# Patient Record
Sex: FEMALE | Race: WHITE | Employment: FULL TIME | URBAN - NONMETROPOLITAN AREA
[De-identification: names, ages, dates, MRNs, and addresses within clinical notes are randomized per-mention and may not be internally consistent; named-entity substitution may affect disease eponyms.]

---
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
## 2020-12-03 ENCOUNTER — HOSPITAL ENCOUNTER (EMERGENCY)
Age: 20
Discharge: HOME | End: 2020-12-03
Attending: EMERGENCY MEDICINE
Payer: MEDICAID

## 2020-12-03 ENCOUNTER — APPOINTMENT (OUTPATIENT)
Dept: GENERAL RADIOLOGY | Age: 20
End: 2020-12-03
Payer: MEDICAID

## 2020-12-03 VITALS
DIASTOLIC BLOOD PRESSURE: 66 MMHG | TEMPERATURE: 98.4 F | BODY MASS INDEX: 20.25 KG/M2 | OXYGEN SATURATION: 100 % | SYSTOLIC BLOOD PRESSURE: 103 MMHG | RESPIRATION RATE: 14 BRPM | WEIGHT: 118 LBS | HEART RATE: 74 BPM

## 2020-12-03 VITALS
RESPIRATION RATE: 16 BRPM | HEART RATE: 78 BPM | OXYGEN SATURATION: 98 % | SYSTOLIC BLOOD PRESSURE: 132 MMHG | DIASTOLIC BLOOD PRESSURE: 78 MMHG | TEMPERATURE: 98.3 F

## 2020-12-03 LAB
ANION GAP SERPL CALCULATED.3IONS-SCNC: 13 MEQ/L (ref 8–16)
BASOPHILS # BLD: 0.5 %
BASOPHILS ABSOLUTE: 0 THOU/MM3 (ref 0–0.1)
BILIRUBIN URINE: ABNORMAL
BLOOD, URINE: ABNORMAL
BUN BLDV-MCNC: 10 MG/DL (ref 7–22)
CALCIUM SERPL-MCNC: 9 MG/DL (ref 8.5–10.5)
CHARACTER, URINE: CLEAR
CHLORIDE BLD-SCNC: 104 MEQ/L (ref 98–111)
CO2: 22 MEQ/L (ref 23–33)
COLOR: YELLOW
CREAT SERPL-MCNC: 0.6 MG/DL (ref 0.4–1.2)
D-DIMER QUANTITATIVE: < 215 NG/ML FEU (ref 0–500)
EKG ATRIAL RATE: 85 BPM
EKG P AXIS: 74 DEGREES
EKG P-R INTERVAL: 134 MS
EKG Q-T INTERVAL: 370 MS
EKG QRS DURATION: 92 MS
EKG QTC CALCULATION (BAZETT): 440 MS
EKG R AXIS: 84 DEGREES
EKG T AXIS: 6 DEGREES
EKG VENTRICULAR RATE: 85 BPM
EOSINOPHIL # BLD: 0.5 %
EOSINOPHILS ABSOLUTE: 0 THOU/MM3 (ref 0–0.4)
ERYTHROCYTE [DISTWIDTH] IN BLOOD BY AUTOMATED COUNT: 12.1 % (ref 11.5–14.5)
ERYTHROCYTE [DISTWIDTH] IN BLOOD BY AUTOMATED COUNT: 41.6 FL (ref 35–45)
GFR SERPL CREATININE-BSD FRML MDRD: > 90 ML/MIN/1.73M2
GLUCOSE BLD-MCNC: 124 MG/DL (ref 70–108)
GLUCOSE URINE: NEGATIVE MG/DL
HCT VFR BLD CALC: 41.7 % (ref 37–47)
HEMOGLOBIN: 13.9 GM/DL (ref 12–16)
IMMATURE GRANS (ABS): 0.01 THOU/MM3 (ref 0–0.07)
IMMATURE GRANULOCYTES: 0.2 %
KETONES, URINE: ABNORMAL
LEUKOCYTE ESTERASE, URINE: ABNORMAL
LYMPHOCYTES # BLD: 31.8 %
LYMPHOCYTES ABSOLUTE: 1.9 THOU/MM3 (ref 1–4.8)
MCH RBC QN AUTO: 31 PG (ref 26–33)
MCHC RBC AUTO-ENTMCNC: 33.3 GM/DL (ref 32.2–35.5)
MCV RBC AUTO: 93.1 FL (ref 81–99)
MONOCYTES # BLD: 8.3 %
MONOCYTES ABSOLUTE: 0.5 THOU/MM3 (ref 0.4–1.3)
NITRITE, URINE: NEGATIVE
NUCLEATED RED BLOOD CELLS: 0 /100 WBC
OSMOLALITY CALCULATION: 278 MOSMOL/KG (ref 275–300)
PH UA: 5.5 (ref 5–9)
PLATELET # BLD: 274 THOU/MM3 (ref 130–400)
PMV BLD AUTO: 9.2 FL (ref 9.4–12.4)
POTASSIUM REFLEX MAGNESIUM: 3.9 MEQ/L (ref 3.5–5.2)
PREGNANCY, SERUM: NEGATIVE
PROTEIN UA: 30 MG/DL
RBC # BLD: 4.48 MILL/MM3 (ref 4.2–5.4)
SEG NEUTROPHILS: 58.7 %
SEGMENTED NEUTROPHILS ABSOLUTE COUNT: 3.5 THOU/MM3 (ref 1.8–7.7)
SODIUM BLD-SCNC: 139 MEQ/L (ref 135–145)
SPECIFIC GRAVITY UA: >= 1.03 (ref 1–1.03)
TROPONIN T: < 0.01 NG/ML
UROBILINOGEN, URINE: 0.2 EU/DL (ref 0.2–1)
WBC # BLD: 6 THOU/MM3 (ref 4.8–10.8)

## 2020-12-03 PROCEDURE — 71045 X-RAY EXAM CHEST 1 VIEW: CPT

## 2020-12-03 PROCEDURE — 36415 COLL VENOUS BLD VENIPUNCTURE: CPT

## 2020-12-03 PROCEDURE — 99214 OFFICE O/P EST MOD 30 MIN: CPT | Performed by: EMERGENCY MEDICINE

## 2020-12-03 PROCEDURE — 6370000000 HC RX 637 (ALT 250 FOR IP): Performed by: EMERGENCY MEDICINE

## 2020-12-03 PROCEDURE — 81003 URINALYSIS AUTO W/O SCOPE: CPT

## 2020-12-03 PROCEDURE — U0003 INFECTIOUS AGENT DETECTION BY NUCLEIC ACID (DNA OR RNA); SEVERE ACUTE RESPIRATORY SYNDROME CORONAVIRUS 2 (SARS-COV-2) (CORONAVIRUS DISEASE [COVID-19]), AMPLIFIED PROBE TECHNIQUE, MAKING USE OF HIGH THROUGHPUT TECHNOLOGIES AS DESCRIBED BY CMS-2020-01-R: HCPCS

## 2020-12-03 PROCEDURE — 93005 ELECTROCARDIOGRAM TRACING: CPT | Performed by: EMERGENCY MEDICINE

## 2020-12-03 PROCEDURE — 85379 FIBRIN DEGRADATION QUANT: CPT

## 2020-12-03 PROCEDURE — 93010 ELECTROCARDIOGRAM REPORT: CPT | Performed by: INTERNAL MEDICINE

## 2020-12-03 PROCEDURE — 84484 ASSAY OF TROPONIN QUANT: CPT

## 2020-12-03 PROCEDURE — 99285 EMERGENCY DEPT VISIT HI MDM: CPT

## 2020-12-03 PROCEDURE — 80048 BASIC METABOLIC PNL TOTAL CA: CPT

## 2020-12-03 PROCEDURE — 84703 CHORIONIC GONADOTROPIN ASSAY: CPT

## 2020-12-03 PROCEDURE — 99213 OFFICE O/P EST LOW 20 MIN: CPT

## 2020-12-03 PROCEDURE — 85025 COMPLETE CBC W/AUTO DIFF WBC: CPT

## 2020-12-03 RX ORDER — IBUPROFEN 400 MG/1
400 TABLET ORAL EVERY 6 HOURS PRN
Qty: 20 TABLET | Refills: 0 | Status: SHIPPED | OUTPATIENT
Start: 2020-12-03 | End: 2021-02-02

## 2020-12-03 RX ORDER — ASPIRIN 81 MG/1
324 TABLET, CHEWABLE ORAL ONCE
Status: COMPLETED | OUTPATIENT
Start: 2020-12-03 | End: 2020-12-03

## 2020-12-03 RX ORDER — PHENAZOPYRIDINE HYDROCHLORIDE 100 MG/1
100 TABLET, FILM COATED ORAL 3 TIMES DAILY PRN
Qty: 9 TABLET | Refills: 0 | Status: SHIPPED | OUTPATIENT
Start: 2020-12-03 | End: 2021-01-25

## 2020-12-03 RX ORDER — ACETAMINOPHEN 500 MG
1000 TABLET ORAL ONCE
Status: COMPLETED | OUTPATIENT
Start: 2020-12-03 | End: 2020-12-03

## 2020-12-03 RX ORDER — CIPROFLOXACIN 500 MG/1
500 TABLET, FILM COATED ORAL 2 TIMES DAILY
Qty: 14 TABLET | Refills: 0 | Status: SHIPPED | OUTPATIENT
Start: 2020-12-03 | End: 2020-12-10

## 2020-12-03 RX ADMIN — ASPIRIN 324 MG: 81 TABLET, CHEWABLE ORAL at 10:41

## 2020-12-03 RX ADMIN — ACETAMINOPHEN 1000 MG: 500 TABLET ORAL at 12:03

## 2020-12-03 ASSESSMENT — PAIN SCALES - GENERAL
PAINLEVEL_OUTOF10: 2
PAINLEVEL_OUTOF10: 2
PAINLEVEL_OUTOF10: 3

## 2020-12-03 ASSESSMENT — ENCOUNTER SYMPTOMS
TROUBLE SWALLOWING: 0
COUGH: 1
EYE DISCHARGE: 0
BACK PAIN: 0
VOMITING: 0
SINUS PRESSURE: 0
CHOKING: 0
EYE PAIN: 0
VOICE CHANGE: 0
NAUSEA: 0
ABDOMINAL PAIN: 1
SHORTNESS OF BREATH: 1
SINUS PAIN: 0
SINUS PRESSURE: 0
COUGH: 0
SORE THROAT: 0
CHEST TIGHTNESS: 0
ABDOMINAL PAIN: 0
CONSTIPATION: 0
SORE THROAT: 1
BLOOD IN STOOL: 0
NAUSEA: 0
EYE REDNESS: 0
DIARRHEA: 0
STRIDOR: 0
FACIAL SWELLING: 0
SHORTNESS OF BREATH: 0
DIARRHEA: 0
CONSTIPATION: 0
EYE PAIN: 0
WHEEZING: 0
BACK PAIN: 0

## 2020-12-03 ASSESSMENT — PAIN DESCRIPTION - FREQUENCY: FREQUENCY: CONTINUOUS

## 2020-12-03 ASSESSMENT — PAIN DESCRIPTION - LOCATION: LOCATION: ABDOMEN

## 2020-12-03 ASSESSMENT — PAIN DESCRIPTION - PAIN TYPE: TYPE: ACUTE PAIN

## 2020-12-03 NOTE — ED NOTES
ED nurse-to-nurse bedside report    Chief Complaint   Patient presents with    Chest Pain    Shortness of Breath      LOC: alert and orientated to name, place, date  Vital signs   Vitals:    12/03/20 1026 12/03/20 1126 12/03/20 1227   BP: 116/62 109/62 110/70   Pulse: 78 72 69   Resp: 15 14 14   Temp: 98.4 °F (36.9 °C)     TempSrc: Oral     SpO2: 100% 100% 100%   Weight: 118 lb (53.5 kg)        Pain:    Pain Interventions: tylenol  Pain Goal: 0  Oxygen: No    Current needs required none   Telemetry: No  LDAs:    Continuous Infusions:   Mobility: Independent  Coles Fall Risk Score: No flowsheet data found.   Fall Interventions: side rails  Report given to: ZAHRAA Hall RN  12/03/20 7545

## 2020-12-03 NOTE — ED PROVIDER NOTES
[address]       Chief Complaint   Patient presents with    Dysuria       Nurses Notes reviewed and I agree except as noted in the HPI. HISTORY OF PRESENT ILLNESS   Randee Patel is a 21 y.o. female who presents with 3-day history of dysuria, urinary frequency, suprapubic pressure. She currently rates abdominal pain at 3 out of 10 in severity. Symptoms identical to previous cystitis, treated with Macrobid. No chest pain, shortness of breath, fever, vomiting, hematuria, dizziness, syncope, vaginal discharge or vaginal bleeding. No possibility of STD or pregnancy. No history of diabetes, urosepsis, pyelonephritis. Non-smoker    REVIEW OF SYSTEMS     Review of Systems   Constitutional: Negative for appetite change, chills, fatigue, fever and unexpected weight change. HENT: Negative for congestion, ear discharge, ear pain, facial swelling, hearing loss, nosebleeds, postnasal drip, sinus pressure, sore throat, trouble swallowing and voice change. Eyes: Negative for pain, discharge, redness and visual disturbance. Respiratory: Negative for cough, choking, shortness of breath, wheezing and stridor. Cardiovascular: Negative for chest pain and leg swelling. Gastrointestinal: Positive for abdominal pain. Negative for blood in stool, constipation, diarrhea, nausea and vomiting. Genitourinary: Positive for dysuria, frequency and urgency. Negative for flank pain, hematuria, vaginal bleeding and vaginal discharge. Musculoskeletal: Negative for arthralgias, back pain, neck pain and neck stiffness. Skin: Negative for rash. Neurological: Negative for dizziness, seizures, syncope, weakness, light-headedness and headaches. Hematological: Negative for adenopathy. Does not bruise/bleed easily. Psychiatric/Behavioral: Negative for confusion, sleep disturbance and suicidal ideas. The patient is not nervous/anxious.     All other systems reviewed and are negative. PAST MEDICAL HISTORY   History reviewed. No pertinent past medical history. Previous history of UTIs, no pyelonephritis, urosepsis, renal colic, diabetes  SURGICAL HISTORY     Patient  has a past surgical history that includes Tonsillectomy. CURRENT MEDICATIONS       Discharge Medication List as of 12/3/2020  9:03 AM      CONTINUE these medications which have NOT CHANGED    Details   [address] [facility] 28 0.25-35 MG-MCG per tablet TAKE 1 TABLET BY MOUTH EVERY DAY, DAWHistorical Med             ALLERGIES     Patient is has No Known Allergies. FAMILY HISTORY     Patient'sfamily history is not on file. Per patient-family history of hypertension no diabetes, renal disease. SOCIAL HISTORY     Patient  reports that she has never smoked. She has never used smokeless tobacco. She reports that she does not drink alcohol or use drugs. PHYSICAL EXAM     ED TRIAGE VITALS  BP: 132/78, Temp: 98.3 °F (36.8 °C), Pulse: 78, Resp: 16, SpO2: 98 %  Physical Exam  Vitals signs and nursing note reviewed. Constitutional:       Appearance: She is well-developed. Comments: Moist membranes, normal airway   HENT:      Head: Normocephalic and atraumatic. Right Ear: Tympanic membrane and external ear normal.      Left Ear: Tympanic membrane and external ear normal.      Nose: Nose normal.      Mouth/Throat:      Pharynx: No oropharyngeal exudate. Comments: Oropharynx normal  Eyes:      General: No scleral icterus. Right eye: No discharge. Left eye: No discharge. Conjunctiva/sclera: Conjunctivae normal.      Pupils: Pupils are equal, round, and reactive to light. Comments: Conjunctiva clear   Neck:      Musculoskeletal: Normal range of motion. Thyroid: No thyromegaly. Vascular: No JVD. Comments: No meningismus  Cardiovascular:      Rate and Rhythm: Normal rate and regular rhythm. Pulses: Normal pulses.       Heart sounds: Normal heart sounds, S1 normal and S2 normal. No murmur. No friction rub. No gallop. Pulmonary:      Effort: Pulmonary effort is normal. No tachypnea or respiratory distress. Breath sounds: Normal breath sounds. No stridor. No decreased breath sounds, wheezing, rhonchi or rales. Comments: No cough, lungs clear  Chest:      Chest wall: No tenderness. Abdominal:      General: Bowel sounds are normal. There is no distension. Palpations: Abdomen is soft. There is no mass. Tenderness: There is abdominal tenderness in the suprapubic area. There is no right CVA tenderness, left CVA tenderness, guarding or rebound. Comments: Slight suprapubic tenderness   Musculoskeletal: Normal range of motion. General: No tenderness. Right lower leg: Normal.      Left lower leg: Normal.      Comments: Joints normal   Lymphadenopathy:      Cervical: Cervical adenopathy present. Right cervical: Superficial cervical adenopathy present. No deep or posterior cervical adenopathy. Left cervical: Superficial cervical adenopathy present. No deep or posterior cervical adenopathy. Skin:     General: Skin is warm and dry. Findings: No erythema or rash. Comments: No rash or bruising on examined areas   Neurological:      Mental Status: She is alert and oriented to person, place, and time. Cranial Nerves: No cranial nerve deficit. Motor: No abnormal muscle tone. Coordination: Coordination normal.      Deep Tendon Reflexes: Reflexes are normal and symmetric. Reflexes normal.      Comments: Appropriate, no focal findings   Psychiatric:         Behavior: Behavior normal.         Thought Content:  Thought content normal.         Judgment: Judgment normal.         DIAGNOSTIC RESULTS   Labs:   Results for orders placed or performed during the hospital encounter of 12/03/20   Urinalysis   Result Value Ref Range    Glucose, Ur Negative NEGATIVE mg/dl    Bilirubin Urine Small (A) NEGATIVE    Ketones, Urine Trace (A) NEGATIVE    Specific Gravity, UA >=1.030 1.002 - 1.030    Blood, Urine Moderate (A) NEGATIVE    pH, UA 5.50 5.0 - 9.0    Protein, UA 30 (A) NEGATIVE mg/dl    Urobilinogen, Urine 0.20 0.2 - 1.0 eu/dl    Nitrite, Urine Negative NEGATIVE    Leukocyte Esterase, Urine Moderate (A) NEGATIVE    Color, UA Yellow STRAW-YELLOW    Character, Urine Clear CLEAR-SL CLOUD       IMAGING:  No orders to display     URGENT CARE COURSE:     Vitals:    12/03/20 0846   BP: 132/78   Pulse: 78   Resp: 16   Temp: 98.3 °F (36.8 °C)   TempSrc: Oral   SpO2: 98%       Medications - No data to display  PROCEDURES:  None  FINALIMPRESSION      1. Acute cystitis with hematuria        DISPOSITION/PLAN   DISPOSITION Decision To Discharge 12/03/2020 08:59:49 AM  Nontoxic, well-hydrated, normal airway. No sepsis or CNS infection. Abdomen nonsurgical.  Patient has acute cystitis without complications. No pyelonephritis or urosepsis. Will treat with Cipro, Pyridium, Tylenol, increased oral clear liquids, rest.  Patient to recheck with PCP in 5 days for reevaluation, and she was given referral number per her request.  She understands to go to ED if worse  PATIENT REFERRED TO:  Recheck with regular doctor    Schedule an appointment as soon as possible for a visit in 5 days  Recheck with regular doctor, use referral number.   Go to emergency if worse    DISCHARGE MEDICATIONS:  Discharge Medication List as of 12/3/2020  9:03 AM      START taking these medications    Details   ciprofloxacin (CIPRO) 500 MG tablet Take 1 tablet by mouth 2 times daily for 7 days, Disp-14 tablet,R-0Print      phenazopyridine (PYRIDIUM) 100 MG tablet Take 1 tablet by mouth 3 times daily as needed for Pain (dysuria), Disp-9 tablet,R-0Print           Discharge Medication List as of 12/3/2020  9:03 AM          MD Tiffany Veloz MD  12/03/20 [id number] Executive MD Domingo  12/03/20 7557

## 2020-12-03 NOTE — LETTER
[facility]  [address] [place] [id number]  Phone: [phone]               December 3, 2020    Patient: Anthony Camarena   YOB: 2000   Date of Visit: 12/3/2020       To Whom It May Concern:    Mike Barba was seen and treated in our emergency department on 12/3/2020. She may return to work on December 5, 2020.   No work December 3 and December 4, 2020    Sincerely,       Edna Benítez MD         Signature:__________________________________

## 2020-12-03 NOTE — ED PROVIDER NOTES
[facility] ENCOUNTER          Pt Name: Olivia Sánchez  MRN: [id number]  [place] 2000  Date of evaluation: 12/3/2020  Physician: Martínez Iraheta MD, Arabella Conde, [address] [place], [place] [address]       Chief Complaint   Patient presents with    Chest Pain    Shortness of Breath     History obtained from chart review and the patient. HISTORY OF PRESENT ILLNESS    HPI  Olivia Sánchez is a 21 y.o. female who presents to the emergency department for evaluation of 1 week mild soreness of breath, dry cough, occasional fever and chills, sore throat. Patient states she believes she may have COVID-19. Patient does not currently have a PCP. States he has not been taking any medication for his symptoms but decided to get checked today. Denies sick contacts at home or at work. The patient has no other acute complaints at this time. REVIEW OF SYSTEMS   Review of Systems   Constitutional: Positive for chills, fatigue and fever. Negative for unexpected weight change. HENT: Positive for sore throat. Negative for congestion, ear pain, nosebleeds, sinus pressure and sinus pain. Eyes: Negative for pain. Respiratory: Positive for cough and shortness of breath. Negative for chest tightness. Cardiovascular: Negative for chest pain. Gastrointestinal: Negative for abdominal pain, constipation, diarrhea and nausea. Endocrine: Negative for polyuria. Genitourinary: Negative for dysuria and flank pain. Musculoskeletal: Negative for arthralgias, back pain, myalgias and neck pain. Skin: Negative for rash. Neurological: Negative for weakness and headaches. All other systems reviewed and are negative. PAST MEDICAL AND SURGICAL HISTORY   History reviewed. No pertinent past medical history.   Past Surgical History:   Procedure Laterality Date    TONSILLECTOMY           MEDICATIONS   No current facility-administered medications for this encounter. Current Outpatient Medications:     ibuprofen (IBU) 400 MG tablet, Take 1 tablet by mouth every 6 hours as needed for Pain, Disp: 20 tablet, Rfl: 0    SPRINTEC 28 0.25-35 MG-MCG per tablet, TAKE 1 TABLET BY MOUTH EVERY DAY, Disp: , Rfl:     ciprofloxacin (CIPRO) 500 MG tablet, Take 1 tablet by mouth 2 times daily for 7 days, Disp: 14 tablet, Rfl: 0    phenazopyridine (PYRIDIUM) 100 MG tablet, Take 1 tablet by mouth 3 times daily as needed for Pain (dysuria), Disp: 9 tablet, Rfl: 0      SOCIAL HISTORY     Social History     Social History Narrative    Not on file     Social History     Tobacco Use    Smoking status: Never Smoker    Smokeless tobacco: Never Used   Substance Use Topics    Alcohol use: No    Drug use: No         ALLERGIES   No Known Allergies      FAMILY HISTORY   No family history on file. PREVIOUS RECORDS   Previous records reviewed:   Seen in the emergency department on December 3, 2020 for cystitis. PHYSICAL EXAM     ED Triage Vitals [12/03/20 1026]   BP Temp Temp Source Pulse Resp SpO2 Height Weight   116/62 98.4 °F (36.9 °C) Oral 78 15 100 % -- 118 lb (53.5 kg)     Initial vital signs and nursing assessment reviewed and normal. Pulsoximetry is normal per my interpretation. Additional Vital Signs:  Vitals:    12/03/20 1318   BP: 103/66   Pulse: 74   Resp:    Temp:    SpO2:        Physical Exam  Vitals signs and nursing note reviewed. Constitutional:       General: She is not in acute distress. Appearance: Normal appearance. She is well-developed. HENT:      Head: Normocephalic and atraumatic. Right Ear: External ear normal.      Left Ear: External ear normal.      Nose: Nose normal.      Mouth/Throat:      Pharynx: Posterior oropharyngeal erythema present. No oropharyngeal exudate. Eyes:      Conjunctiva/sclera: Conjunctivae normal.      Pupils: Pupils are equal, round, and reactive to light. Neck:      Musculoskeletal: Neck supple. Vascular: No JVD. Cardiovascular:      Rate and Rhythm: Normal rate and regular rhythm. Heart sounds: Normal heart sounds. No murmur. No friction rub. No gallop. Pulmonary:      Effort: Pulmonary effort is normal.      Breath sounds: Normal breath sounds. No stridor. No wheezing or rales. Skin:     General: Skin is warm and dry. Neurological:      Mental Status: She is alert and oriented to person, place, and time. Psychiatric:         Behavior: Behavior normal.             MEDICAL DECISION MAKING   Initial Assessment: Viral illness, there is a possibility of the symptoms being caused by COVID-19. Chest pain with abnormal but not acutely ischemic EKG. Plan: I V line, labs, analgesia, imaging, observation. ED RESULTS   Laboratory results:  Labs Reviewed   CBC WITH AUTO DIFFERENTIAL - Abnormal; Notable for the following components:       Result Value    MPV 9.2 (*)     All other components within normal limits   BASIC METABOLIC PANEL W/ REFLEX TO MG FOR LOW K - Abnormal; Notable for the following components:    CO2 22 (*)     Glucose 124 (*)     All other components within normal limits   TROPONIN   HCG, SERUM, QUALITATIVE   D-DIMER, QUANTITATIVE   ANION GAP   OSMOLALITY   GLOMERULAR FILTRATION RATE, ESTIMATED   COVID-19 AMBULATORY       Radiologic studies results:  XR CHEST PORTABLE   Final Result   1. No acute cardiopulmonary findings. **This report has been created using voice recognition software. It may contain minor errors which are inherent in voice recognition technology. **      Final report electronically signed by Dr. Kathy Bush on 12/3/2020 10:47 AM                ED COURSE   ED Medications administered this visit:   Medications   aspirin chewable tablet 324 mg (324 mg Oral Given 12/3/20 1041)   acetaminophen (TYLENOL) tablet 1,000 mg (1,000 mg Oral Given 12/3/20 1203)       ED Course as of Dec 03 1407   Thu Dec 03, 2020   1400 Patient pain-free at the moment, feeling much better, feeling comfortable going home. Work-up is all within normal limits, negative D-dimer and negative troponin. We will advised to follow-up with PCP for possible abnormal EKG versus persistent juvenile T wave inversion. Will discharge. [DT]      ED Course User Index  [DT] Franky Jarrell MD     Strict return precautions and follow up instructions were discussed with the patient prior to discharge, with which the patient agrees. MEDICATION CHANGES     New Prescriptions    IBUPROFEN (IBU) 400 MG TABLET    Take 1 tablet by mouth every 6 hours as needed for Pain         FINAL DISPOSITION     Final diagnoses:   Atypical chest pain   Suspected COVID-19 virus infection   EKG, abnormal     Condition: condition: good  Dispo: Discharge to home      This transcription was electronically signed. It was dictated by use of voice recognition software and electronically transcribed. The transcription may contain errors not detected in proofreading.        Franky Jarrell MD  12/03/20 7980

## 2020-12-03 NOTE — ED NOTES
Pt discharged. Pt verbalized understanding of discharge instructions and scripts. Pt walked out per self. Pt in stable condition.      Harriet Barber, DENIS  09/03/84 5669

## 2020-12-03 NOTE — ED NOTES
[facility] Pt is resting on cot, respirations even and unlabored.      Rachela Hart RN  12/03/20 6562

## 2020-12-04 ENCOUNTER — CARE COORDINATION (OUTPATIENT)
Dept: CARE COORDINATION | Age: 20
End: 2020-12-04

## 2020-12-04 LAB — SARS-COV-2: DETECTED

## 2020-12-04 NOTE — CARE COORDINATION
Patient contacted regarding COVID-19 exposure. Discussed COVID-19 related testing which was pending at this time. Test results were pending. Patient informed of results, if available? No    Care Transition Nurse/ Ambulatory Care Manager contacted the patient by telephone to perform post discharge assessment. Call within 2 business days of discharge: Yes. Verified name and  with patient as identifiers. Provided introduction to self, and explanation of the CTN/ACM role, and reason for call due to risk factors for infection and/or exposure to COVID-19. Symptoms reviewed with patient who verbalized the following symptoms: cough, shortness of breath, chest pain and nasal congestion, runny nose, sore throat. Due to no new or worsening symptoms encounter was not routed to provider for escalation. Discussed follow-up appointments. If no appointment was previously scheduled, appointment scheduling offered: Yes and scheduled to est PCP  Gulshan Robles Dr follow up appointment(s):   Future Appointments   Date Time Provider Tammy Reyes   2020  2:00 PM 3980 Max VARGAS DO SRPX  RES [address] [place] [address]     Non-[facility] follow up appointment(s): DAVE    Non-face-to-face services provided:  Obtained and reviewed discharge summary and/or continuity of care documents  loop enrollment     Advance Care Planning:   Does patient have an Advance Directive:  reviewed and current. Patient has following risk factors of: no known risk factors. CTN/ACM reviewed discharge instructions, medical action plan and red flags such as increased shortness of breath, increasing fever and signs of decompensation with patient who verbalized understanding. Discussed exposure protocols and quarantine with CDC Guidelines What to do if you are sick with coronavirus disease .  Patient was given an opportunity for questions and concerns.  The patient agrees to contact the [facility] exposure line [phone], [facility] department [place] Department of [facility]: ([phone]) and PCP office for questions related to their healthcare. CTN/ACM provided contact information for future needs. Reviewed and educated patient on any new and changed medications related to discharge diagnosis     Patient/family/caregiver given information for Get[facility] Loop and agrees to enroll yes  Patient's preferred e-mail:    Patient's preferred phone number: [phone]  Based on Loop alert triggers, patient will be contacted by nurse care manager for worsening symptoms. Pt will be further monitored by COVID Loop Team based on severity of symptoms and risk factors. Advised symptom management, fluids, rest, report new worsening symptoms to ACM or Loop. Taking cipro for UTI. Scheduled appt to est PCP.

## 2020-12-07 ENCOUNTER — HOSPITAL ENCOUNTER (EMERGENCY)
Age: 20
Discharge: HOME | End: 2020-12-07
Payer: MEDICAID

## 2020-12-07 VITALS
TEMPERATURE: 98.7 F | OXYGEN SATURATION: 99 % | SYSTOLIC BLOOD PRESSURE: 102 MMHG | HEART RATE: 90 BPM | RESPIRATION RATE: 16 BRPM | DIASTOLIC BLOOD PRESSURE: 58 MMHG

## 2020-12-07 LAB
BILIRUBIN URINE: NEGATIVE
BLOOD, URINE: NEGATIVE
CHARACTER, URINE: CLEAR
COLOR: YELLOW
GLUCOSE URINE: NEGATIVE MG/DL
KETONES, URINE: NEGATIVE
LEUKOCYTE ESTERASE, URINE: NEGATIVE
NITRITE, URINE: NEGATIVE
PH UA: 5.5 (ref 5–9)
PROTEIN UA: ABNORMAL MG/DL
SPECIFIC GRAVITY UA: >= 1.03 (ref 1–1.03)
UROBILINOGEN, URINE: 0.2 EU/DL (ref 0.2–1)

## 2020-12-07 PROCEDURE — 96372 THER/PROPH/DIAG INJ SC/IM: CPT

## 2020-12-07 PROCEDURE — 6360000002 HC RX W HCPCS: Performed by: NURSE PRACTITIONER

## 2020-12-07 PROCEDURE — 2500000003 HC RX 250 WO HCPCS: Performed by: NURSE PRACTITIONER

## 2020-12-07 PROCEDURE — 99213 OFFICE O/P EST LOW 20 MIN: CPT

## 2020-12-07 PROCEDURE — 99213 OFFICE O/P EST LOW 20 MIN: CPT | Performed by: NURSE PRACTITIONER

## 2020-12-07 PROCEDURE — 81003 URINALYSIS AUTO W/O SCOPE: CPT

## 2020-12-07 RX ADMIN — LIDOCAINE HYDROCHLORIDE 1 G: 10 INJECTION, SOLUTION EPIDURAL; INFILTRATION; INTRACAUDAL; PERINEURAL at 13:24

## 2020-12-07 ASSESSMENT — PAIN SCALES - GENERAL: PAINLEVEL_OUTOF10: 3

## 2020-12-07 ASSESSMENT — PAIN DESCRIPTION - PAIN TYPE: TYPE: ACUTE PAIN

## 2020-12-07 ASSESSMENT — ENCOUNTER SYMPTOMS
VOMITING: 0
SHORTNESS OF BREATH: 0
BACK PAIN: 1
COUGH: 0
NAUSEA: 0

## 2020-12-07 ASSESSMENT — PAIN DESCRIPTION - LOCATION: LOCATION: BACK

## 2020-12-07 ASSESSMENT — PAIN DESCRIPTION - ORIENTATION: ORIENTATION: LOWER

## 2020-12-07 NOTE — ED NOTES
Patient understood instructions verbally,  Follow up with PCP with any concerns, or worse UTI symptoms with elevated fevers, follow up with ED.  ambulated self to lobby,stable condition.       Juanita White LPN  93/32/02 3711

## 2020-12-07 NOTE — ED NOTES
Pt complains of having been diagnosed with covid and a uti on Friday. States she is still on her cipro, but she isn't feeling any better.       Chivo Lorenzo RN  12/07/20 3928

## 2020-12-07 NOTE — ED PROVIDER NOTES
[facility]       CHIEF COMPLAINT       Chief Complaint   Patient presents with    Urinary Tract Infection       Nurses Notes reviewed and I agree except as noted in the HPI. HISTORY OF PRESENT ILLNESS   Shefali Caraballo is a 21 y.o. female who presents for evaluation of urinary frequency and low back pain. Patient states that this is been ongoing ever since her UTI diagnosis 4 days ago. Patient was also diagnosed with coronavirus at that time. She denies any fever, chills, nausea, or vomiting. Patient states that she was told to return if her back pain did not improve as this is a sign of her urinary tract infection not improving. The history is provided by the patient. REVIEW OF SYSTEMS     Review of Systems   Constitutional: Negative for chills and fever. Respiratory: Negative for cough and shortness of breath. Cardiovascular: Negative for chest pain. Gastrointestinal: Negative for nausea and vomiting. Genitourinary: Positive for frequency. Negative for dysuria. Musculoskeletal: Positive for back pain. Negative for arthralgias and myalgias. Skin: Negative for rash. Allergic/Immunologic: Negative for environmental allergies. Neurological: Negative for headaches. PAST MEDICAL HISTORY   History reviewed. No pertinent past medical history. SURGICALHISTORY     Patient  has a past surgical history that includes Tonsillectomy. CURRENT MEDICATIONS       Previous Medications    CIPROFLOXACIN (CIPRO) 500 MG TABLET    Take 1 tablet by mouth 2 times daily for 7 days    IBUPROFEN (IBU) 400 MG TABLET    Take 1 tablet by mouth every 6 hours as needed for Pain    PHENAZOPYRIDINE (PYRIDIUM) 100 MG TABLET    Take 1 tablet by mouth 3 times daily as needed for Pain (dysuria)    SPRINTEC 28 0.25-35 MG-MCG PER TABLET    TAKE 1 TABLET BY MOUTH EVERY DAY       ALLERGIES     Patient is has No Known Allergies.     Patients   There is no immunization history on file for this patient. FAMILY HISTORY     Patient's family history is not on file. SOCIAL HISTORY     Patient  reports that she has never smoked. She has never used smokeless tobacco. She reports that she does not drink alcohol or use drugs. PHYSICAL EXAM     ED TRIAGE VITALS  BP: 119/67, Temp: 98.7 °F (37.1 °C), Pulse: 92, Resp: 16, SpO2: 99 %,Estimated body mass index is 20.25 kg/m² as calculated from the following:    Height as of 6/21/18: 5' 4\" (1.626 m). Weight as of 12/3/20: 118 lb (53.5 kg). ,Patient's last menstrual period was 11/18/2020. Physical Exam  Vitals signs and nursing note reviewed. Constitutional:       General: She is not in acute distress. Appearance: She is well-developed. She is not diaphoretic. Eyes:      Conjunctiva/sclera:      Right eye: Right conjunctiva is not injected. Left eye: Left conjunctiva is not injected. Pupils: Pupils are equal.   Neck:      Musculoskeletal: Normal range of motion. Cardiovascular:      Rate and Rhythm: Normal rate and regular rhythm. Heart sounds: No murmur. Pulmonary:      Effort: Pulmonary effort is normal. No respiratory distress. Breath sounds: Normal breath sounds. Abdominal:      Tenderness: There is no abdominal tenderness. There is no right CVA tenderness or left CVA tenderness. Musculoskeletal:      Right knee: She exhibits normal range of motion. Left knee: She exhibits normal range of motion. Lumbar back: She exhibits tenderness (bilateral). She exhibits no bony tenderness. Skin:     General: Skin is warm. Findings: No rash. Neurological:      Mental Status: She is alert and oriented to person, place, and time.    Psychiatric:         Behavior: Behavior normal.         DIAGNOSTIC RESULTS     Labs:  Results for orders placed or performed during the hospital encounter of 12/07/20   Urinalysis   Result Value Ref Range    Glucose, Ur Negative NEGATIVE mg/dl    Bilirubin Urine Negative NEGATIVE    Ketones, Urine Negative NEGATIVE    Specific Gravity, UA >=1.030 1.002 - 1.030    Blood, Urine Negative NEGATIVE    pH, UA 5.50 5.0 - 9.0    Protein, UA Trace (A) NEGATIVE mg/dl    Urobilinogen, Urine 0.20 0.2 - 1.0 eu/dl    Nitrite, Urine Negative NEGATIVE    Leukocyte Esterase, Urine Negative NEGATIVE    Color, UA Yellow STRAW-YELLOW    Character, Urine Clear CLEAR-SL CLOUD       IMAGING:    No orders to display         EKG: none      URGENT CARE COURSE:     Vitals:    12/07/20 1245   BP: 119/67   Pulse: 92   Resp: 16   Temp: 98.7 °F (37.1 °C)   SpO2: 99%       Medications   cefTRIAXone (ROCEPHIN) 1 g in lidocaine 1 % 2.86 mL IM Injection (has no administration in time range)            PROCEDURES:  None    FINAL IMPRESSION      1. Acute bilateral low back pain without sciatica    2. COVID-19 virus infection          DISPOSITION/ PLAN       Urine sample is reassuring that the urinary tract infection is currently clearing up. Did discuss with the patient she needs to continue to push fluids and she will be given a shot of Rocephin to and is advised to continue her Cipro ensure that all infection will be cleared. I did discuss with the patient that I believe her back pain could possibly be related to her coronavirus and she is advised to continue over-the-counter pain medications as well as apply ice/heat to the area. She is agreeable to plan as discussed and will follow-up on an outpatient basis as needed. PATIENT REFERRED TO:  No primary care provider on file. No primary physician on file.       DISCHARGE MEDICATIONS:  New Prescriptions    No medications on file       Discontinued Medications    No medications on file       Current Discharge Medication List          KARINE Longoria - CNP    (Please note that portions of this note were completed with a voice recognition program. Efforts were made to edit the dictations but occasionally words are mis-transcribed.)          Annie Lucia Senthil Ann, APRN - CNP  12/07/20 8300

## 2020-12-21 ENCOUNTER — TELEPHONE (OUTPATIENT)
Dept: FAMILY MEDICINE CLINIC | Age: 20
End: 2020-12-21

## 2020-12-21 ENCOUNTER — OFFICE VISIT (OUTPATIENT)
Dept: FAMILY MEDICINE CLINIC | Age: 20
End: 2020-12-21
Payer: MEDICAID

## 2020-12-21 VITALS
BODY MASS INDEX: 21.51 KG/M2 | SYSTOLIC BLOOD PRESSURE: 102 MMHG | TEMPERATURE: 97.7 F | HEART RATE: 86 BPM | WEIGHT: 126 LBS | DIASTOLIC BLOOD PRESSURE: 68 MMHG | OXYGEN SATURATION: 99 % | HEIGHT: 64 IN

## 2020-12-21 PROCEDURE — G8420 CALC BMI NORM PARAMETERS: HCPCS | Performed by: STUDENT IN AN ORGANIZED HEALTH CARE EDUCATION/TRAINING PROGRAM

## 2020-12-21 PROCEDURE — G8427 DOCREV CUR MEDS BY ELIG CLIN: HCPCS | Performed by: STUDENT IN AN ORGANIZED HEALTH CARE EDUCATION/TRAINING PROGRAM

## 2020-12-21 PROCEDURE — 99204 OFFICE O/P NEW MOD 45 MIN: CPT | Performed by: STUDENT IN AN ORGANIZED HEALTH CARE EDUCATION/TRAINING PROGRAM

## 2020-12-21 PROCEDURE — 1036F TOBACCO NON-USER: CPT | Performed by: STUDENT IN AN ORGANIZED HEALTH CARE EDUCATION/TRAINING PROGRAM

## 2020-12-21 PROCEDURE — G8484 FLU IMMUNIZE NO ADMIN: HCPCS | Performed by: STUDENT IN AN ORGANIZED HEALTH CARE EDUCATION/TRAINING PROGRAM

## 2020-12-21 RX ORDER — ESCITALOPRAM OXALATE 10 MG/1
10 TABLET ORAL DAILY
Qty: 30 TABLET | Refills: 1 | Status: SHIPPED | OUTPATIENT
Start: 2020-12-21 | End: 2021-01-25 | Stop reason: ALTCHOICE

## 2020-12-21 SDOH — ECONOMIC STABILITY: TRANSPORTATION INSECURITY
IN THE PAST 12 MONTHS, HAS LACK OF TRANSPORTATION KEPT YOU FROM MEETINGS, WORK, OR FROM GETTING THINGS NEEDED FOR DAILY LIVING?: NO

## 2020-12-21 SDOH — HEALTH STABILITY: MENTAL HEALTH: HOW MANY STANDARD DRINKS CONTAINING ALCOHOL DO YOU HAVE ON A TYPICAL DAY?: NOT ASKED

## 2020-12-21 SDOH — ECONOMIC STABILITY: INCOME INSECURITY: HOW HARD IS IT FOR YOU TO PAY FOR THE VERY BASICS LIKE FOOD, HOUSING, MEDICAL CARE, AND HEATING?: NOT HARD AT ALL

## 2020-12-21 SDOH — ECONOMIC STABILITY: FOOD INSECURITY: WITHIN THE PAST 12 MONTHS, YOU WORRIED THAT YOUR FOOD WOULD RUN OUT BEFORE YOU GOT MONEY TO BUY MORE.: NEVER TRUE

## 2020-12-21 SDOH — ECONOMIC STABILITY: FOOD INSECURITY: WITHIN THE PAST 12 MONTHS, THE FOOD YOU BOUGHT JUST DIDN'T LAST AND YOU DIDN'T HAVE MONEY TO GET MORE.: NEVER TRUE

## 2020-12-21 SDOH — HEALTH STABILITY: MENTAL HEALTH: HOW OFTEN DO YOU HAVE A DRINK CONTAINING ALCOHOL?: NOT ASKED

## 2020-12-21 SDOH — ECONOMIC STABILITY: TRANSPORTATION INSECURITY
IN THE PAST 12 MONTHS, HAS THE LACK OF TRANSPORTATION KEPT YOU FROM MEDICAL APPOINTMENTS OR FROM GETTING MEDICATIONS?: NO

## 2020-12-21 ASSESSMENT — ENCOUNTER SYMPTOMS
RHINORRHEA: 0
BACK PAIN: 0
COUGH: 0
SHORTNESS OF BREATH: 1
CONSTIPATION: 0
ABDOMINAL PAIN: 0
DIARRHEA: 0
EYE REDNESS: 0

## 2020-12-21 ASSESSMENT — PATIENT HEALTH QUESTIONNAIRE - PHQ9
SUM OF ALL RESPONSES TO PHQ9 QUESTIONS 1 & 2: 2
2. FEELING DOWN, DEPRESSED OR HOPELESS: 1
SUM OF ALL RESPONSES TO PHQ QUESTIONS 1-9: 2
SUM OF ALL RESPONSES TO PHQ QUESTIONS 1-9: 2
1. LITTLE INTEREST OR PLEASURE IN DOING THINGS: 1
SUM OF ALL RESPONSES TO PHQ QUESTIONS 1-9: 2

## 2020-12-21 NOTE — PROGRESS NOTES
S: 21 y.o. female with   Chief Complaint   Patient presents with    New Patient     hx of no family dr Espinoza Dietrich of Breath    Chest Pain    ED Follow-up       Here to get established - did have covid on dec 3rd and had had symptoms on nov 30th with a sore throat - chest discomfort then - episodes when exerting herself, will get winded - but goes away when resting    Had a uti and all better now since treatment in the dem    Depression screening was positive - on and off since 7th grade - over the past 2 months feels like she is depressed again - concentration, hopeless ness insomnia - has not had counseling or been treated - mom is on medication for this      BP Readings from Last 3 Encounters:   12/21/20 102/68   12/07/20 (!) 102/58   12/03/20 103/66     Wt Readings from Last 3 Encounters:   12/21/20 126 lb (57.2 kg)   12/03/20 118 lb (53.5 kg)   06/21/18 118 lb (53.5 kg) (38 %, Z= -0.32)*     * Growth percentiles are based on CDC (Girls, 2-20 Years) data. O: VS:   Vitals:    12/21/20 1410   BP: 102/68   Pulse: 86   Temp: 97.7 °F (36.5 °C)   SpO2: 99%   Weight: 126 lb (57.2 kg)   Height: 5' 4.17\" (1.63 m)     Body mass index is 21.51 kg/m². AAO/NAD, appropriate affect for mood  Normocephalic, atraumatic, eyes - conjunctiva and sclera normal,   skin no rashes on exposed areas   Insight, judgement normal and in no acute distress      Lab Results   Component Value Date    WBC 6.0 12/03/2020    HGB 13.9 12/03/2020    HCT 41.7 12/03/2020     12/03/2020    AST 19 06/21/2018     12/03/2020    K 3.9 12/03/2020     12/03/2020    CREATININE 0.6 12/03/2020    BUN 10 12/03/2020    CO2 22 (L) 12/03/2020    LABGLOM >90 12/03/2020    CALCIUM 9.0 12/03/2020       Xr Chest Portable    Result Date: 12/3/2020  PROCEDURE: XR CHEST PORTABLE CLINICAL INFORMATION: Chest pain COMPARISON: No prior study.  TECHNIQUE:  AP mobile chest single view  FINDINGS: The heart size is normal. No focal consolidation, pleural effusion or pneumothorax is seen. No acute osseous findings are demonstrated. 1. No acute cardiopulmonary findings. **This report has been created using voice recognition software. It may contain minor errors which are inherent in voice recognition technology. ** Final report electronically signed by Dr. Heike Lara on 12/3/2020 10:47 AM           Diagnosis Orders   1. Episode of recurrent major depressive disorder, unspecified depression episode severity ([address]  escitalopram (LEXAPRO) 10 MG tablet    TSH With Reflex Ft4    Julisa Dominguez MD, Psychiatry, [address] [place] [address]   2. COVID-19     3. Lipid screening  Lipid Panel   4. Uses birth control  External Referral To Ob-Gyn   5. Varicella vaccination status unknown  Varicella Zoster Antibody, IgG   6. Recent urinary tract infection     7. Marijuana use         Plan  Will do some labs for the depression to work it up - chol and the tsh, can do the varicella, hiv if wants it    Will start zoloft 50mg and refer to counseling - open to it    Will see you back in a month    Wants the flu vaccine    Ok for the hpv vaccine - can send us the record and then come back for the vaccine       Return in about 4 weeks (around 1/18/2021) for / recover covid and meds depression.     Orders Placed:  Orders Placed This Encounter   Procedures    Varicella Zoster Antibody, IgG    Lipid Panel    TSH With Reflex Ft4    External Referral To Ob-Gyn   Micaela Sena MD, Psychiatry, [address] [place] [address]     Medications Prescribed:  Orders Placed This Encounter   Medications    escitalopram (LEXAPRO) 10 MG tablet     Sig: Take 1 tablet by mouth daily     Dispense:  30 tablet     Refill:  1       Future Appointments   Date Time Provider Tammy Reyes   1/25/2021  8:40 AM Alvia Po, DO SRPX [facility] Maintenance Due   Topic Date Due    Varicella vaccine (1 of 2 - 2-dose childhood series) 06/18/2001    HPV vaccine (1 - 2-dose series) 06/18/2011    HIV screen  06/18/2015    Chlamydia screen  06/18/2016    DTaP/Tdap/Td vaccine (1 - Tdap) 06/18/2019    Flu vaccine (1) 09/01/2020         Attending Physician Statement  I have discussed the case, including pertinent history and exam findings with the resident. I also have seen the patient and performed key portions of the examination. I agree with the documented assessment and plan as documented by the resident.   GE modifier added to this encounter      Greta Naik DO 12/22/2020 4:02 PM

## 2020-12-21 NOTE — TELEPHONE ENCOUNTER
Lillia Boeck, CNP  Doctor in [facility]   [facility] [employer]   Address: [address] [facility], [address] [place] [address]   Phone: [phone]    CALLED OFFICE,  SPOKE WITHMisty  She states no records of patient.

## 2020-12-21 NOTE — PROGRESS NOTES
Nikkie Long is a 21 y.o. female who presents today for:  Chief Complaint   Patient presents with    New Patient     hx of no family dr Flakita Waters of Breath    Chest Pain    ED Follow-up       Goals    None         HPI:     HPI    Cc: establish care, ED fu    Recent UTI ED 12/7/2020  Denies frequent UTI. She states she had one about a month ago. But before that had not really had any. Patient denies dysuria, urinary frequency, urinary urgency, fever, nausea, vomiting, back pain. Chest pain/SOB  Patient was COVID positive on 12/3/2020  Just went back to work and does feel short of breath with exertion. She notes she had some lightheadedness and dizziness. Chest pain midsternal and radiates to left side, she states it feels tight and relaxes when breathes out. She notes she will have \"attacks\" that will go away on its own if she stops exerting herself. She notes symptoms of shortness of breath first started around November 30th. Episodes are happening left open. No fevers. No cough. Denies cramping/swelling in her legs. She does have some leg soreness. Took vitamin C and elderberry and when symptoms started to reduce she stooped (1 week ago). Smokes marijuana 2-3 a day for 2 years. HM:  HPV vaccine ([facility]) patient unsure when she got this  Flu vaccine has not had  Tetanus booster due for  Depression screening showed positive on rooming  Lipid panel-agreeable    Grandparents have diabetes    Depression  Patient notes that since 7th grade she has had issues with depression. She states it worsens during winter or when things are stressful. She recently has been having depression for over 2 months now. She complains of the following:  Poor sleep  Concentration good  Decreased appetite  psychomotor retardation  Fatigue  Depressed mood & loss of interest  Hopelessness    Denies SI    Lactose intolerant tries to avoid dairy. No question data found.     Past Medical History:   Diagnosis Date    Cysts of both ovaries       Past Surgical History:   Procedure Laterality Date    TONSILLECTOMY AND ADENOIDECTOMY      TYMPANOSTOMY TUBE PLACEMENT       No family history on file. Social History     Tobacco Use    Smoking status: Never Smoker    Smokeless tobacco: Never Used   Substance Use Topics    Alcohol use: Not Currently      Current Outpatient Medications   Medication Sig Dispense Refill    escitalopram (LEXAPRO) 10 MG tablet Take 1 tablet by mouth daily 30 tablet 1    SPRINTEC 28 0.25-35 MG-MCG per tablet TAKE 1 TABLET BY MOUTH EVERY DAY      phenazopyridine (PYRIDIUM) 100 MG tablet Take 1 tablet by mouth 3 times daily as needed for Pain (dysuria) 9 tablet 0    ibuprofen (IBU) 400 MG tablet Take 1 tablet by mouth every 6 hours as needed for Pain 20 tablet 0     No current facility-administered medications for this visit. No Known Allergies    Health Maintenance   Topic Date Due    Varicella vaccine (1 of 2 - 2-dose childhood series) 06/18/2001    HPV vaccine (1 - 2-dose series) 06/18/2011    HIV screen  06/18/2015    Chlamydia screen  06/18/2016    DTaP/Tdap/Td vaccine (1 - Tdap) 06/18/2019    Flu vaccine (1) 09/01/2020    Hepatitis A vaccine  Aged Out    Hepatitis B vaccine  Aged Out    Hib vaccine  Aged Out    Meningococcal (ACWY) vaccine  Aged Out    Pneumococcal 0-64 years Vaccine  Aged Out       Subjective:      Review of Systems   Constitutional: Negative for chills and fever. HENT: Negative for congestion and rhinorrhea. Eyes: Negative for redness and visual disturbance. Respiratory: Positive for shortness of breath. Negative for cough. Cardiovascular: Positive for chest pain. Negative for leg swelling. Gastrointestinal: Negative for abdominal pain, constipation and diarrhea. Genitourinary: Negative for dysuria and hematuria. Menstrual cramping   Musculoskeletal: Negative for back pain and myalgias.    Skin: Negative for rash and wound. Neurological: Negative for dizziness and headaches. Hematological: Does not bruise/bleed easily. Psychiatric/Behavioral: Positive for dysphoric mood and sleep disturbance. Negative for suicidal ideas. The patient is not nervous/anxious. See HPI  Objective:     Vitals:    12/21/20 1410   BP: 102/68   Pulse: 86   Temp: 97.7 °F (36.5 °C)   SpO2: 99%   Weight: 126 lb (57.2 kg)   Height: 5' 4.17\" (1.63 m)       Body mass index is 21.51 kg/m². Wt Readings from Last 3 Encounters:   12/21/20 126 lb (57.2 kg)   12/03/20 118 lb (53.5 kg)   06/21/18 118 lb (53.5 kg) (38 %, Z= -0.32)*     * Growth percentiles are based on [facility] (Girls, 2-20 Years) data. BP Readings from Last 3 Encounters:   12/21/20 102/68   12/07/20 (!) 102/58   12/03/20 103/66       Physical Exam  Vitals signs and nursing note reviewed. Constitutional:       General: She is not in acute distress. Appearance: She is not ill-appearing, toxic-appearing or diaphoretic. HENT:      Head: Normocephalic and atraumatic. Right Ear: External ear normal.      Left Ear: External ear normal.      Nose: Nose normal.      Mouth/Throat:      Mouth: Mucous membranes are moist.      Pharynx: Oropharynx is clear. Eyes:      Conjunctiva/sclera: Conjunctivae normal.   Neck:      Musculoskeletal: Normal range of motion. Cardiovascular:      Rate and Rhythm: Normal rate and regular rhythm. Pulses: Normal pulses. Heart sounds: Normal heart sounds. Comments: No peripheral erythema. Lower legs symmetrical.  Pulmonary:      Effort: Pulmonary effort is normal. No respiratory distress. Breath sounds: Normal breath sounds. No wheezing, rhonchi or rales. Abdominal:      Palpations: Abdomen is soft. Tenderness: There is no abdominal tenderness. There is no guarding or rebound. Musculoskeletal: Normal range of motion. Right lower leg: No edema. Left lower leg: No edema. Skin:     General: Skin is warm and dry. Neurological:      General: No focal deficit present. Mental Status: She is alert. Psychiatric:         Attention and Perception: Attention and perception normal.         Mood and Affect: Mood normal. Affect is flat. Behavior: Behavior normal. Behavior is cooperative. Thought Content: Thought content normal.         Cognition and Memory: Cognition and memory normal.         Judgment: Judgment normal.           Lab Results   Component Value Date    WBC 6.0 12/03/2020    HGB 13.9 12/03/2020    HCT 41.7 12/03/2020     12/03/2020    AST 19 06/21/2018     12/03/2020    K 3.9 12/03/2020     12/03/2020    CREATININE 0.6 12/03/2020    BUN 10 12/03/2020    CO2 22 (L) 12/03/2020    LABGLOM >90 12/03/2020    CALCIUM 9.0 12/03/2020       Imaging Results:    Xr Chest Portable    Result Date: 12/3/2020  PROCEDURE: XR CHEST PORTABLE CLINICAL INFORMATION: Chest pain COMPARISON: No prior study. TECHNIQUE:  AP mobile chest single view  FINDINGS: The heart size is normal. No focal consolidation, pleural effusion or pneumothorax is seen. No acute osseous findings are demonstrated. 1. No acute cardiopulmonary findings. **This report has been created using voice recognition software. It may contain minor errors which are inherent in voice recognition technology. ** Final report electronically signed by Dr. Jono Leone on 12/3/2020 10:47 AM        Assessment / Plan:     1. Episode of recurrent major depressive disorder, unspecified depression episode severity ([address]  - escitalopram (LEXAPRO) 10 MG tablet; Take 1 tablet by mouth daily  Dispense: 30 tablet; Refill: 1  - TSH With Reflex Ft4; Future  - Tena Nageotte, MD, Psychiatry, [facility] II.MICHEAL    Patient's mother is on Lexapro and had tried previous SSRIs before this. Informed patient that sometimes there is a genetic component. And she wanted to try the medication that her mother was on.   Informed the patient that it will take 4 to 6 weeks to see benefit. Discussed possible side effects of SSRI with patient. Patient is also agreeable to doing counseling prefers a female provider. 2. COVID-19  Do not suspect superimposed bacterial pneumonia at this time  Do not suspect PE at this time  High suspicion that patient's shortness of breath and chest discomfort are due to her Covid 19 infection. Patient O2 sat on room air: 99%  No tachycardia. No tachypnea. Patient does not meet criteria for further Covid treatment at this time  Patient denies albuterol inhaler at this time as she says she is improving      3. Lipid screening  Family history of diabetes in grandparents. - Lipid Panel; Future    4. Uses birth control  - External Referral To Ob-Gyn  Patient already sees OB/GYN and would like to stick with this provider. 5. Varicella vaccination status unknown  - Varicella Zoster Antibody, IgG; Future    6. Recent urinary tract infection  Patient denies any further urinary symptoms or fevers    7. marijuana use  Discussed with patient the negative effects of smoking are similar to that of tobacco negligent with marijuana. Counseled on discontinuation. Patient plans to get her flu vaccine, tetanus booster, HPV vaccine at next appointment. Patient will bring in her HPV saying records       Return in about 4 weeks (around 1/18/2021) for / recover covid and meds depression. Medications Prescribed:  Orders Placed This Encounter   Medications    escitalopram (LEXAPRO) 10 MG tablet     Sig: Take 1 tablet by mouth daily     Dispense:  30 tablet     Refill:  1       Future Appointments   Date Time Provider Tammy Reyes   1/25/2021  8:40 AM Alexandra Gray DO SRPX [facility]       Patient given educational materials - see patient instructions. Discussed use, benefit, and side effects of prescribed medications. All patient questions answered. Patient voiced understanding. Reviewed health maintenance.   Instructed to continue current medications, diet and exercise. Patient agreed with treatment plan. Follow up as directed.      Electronically signed by Kendall Collado DO on 12/21/2020 at 4:28 PM

## 2020-12-21 NOTE — PROGRESS NOTES
Health Maintenance Due   Topic Date Due    Varicella vaccine (1 of 2 - 2-dose childhood series) 06/18/2001 pending     HPV vaccine (1 - 2-dose series) 06/18/2011 refused     HIV screen  06/18/2015 refused     Chlamydia screen  06/18/2016 oblisan  Dr. Kiera Ruiz in [facility] DTaP/Tdap/Td vaccine (1 - Tdap) 06/18/2019 refused     Flu vaccine (1) 09/01/2020 refused

## 2021-01-06 ENCOUNTER — VIRTUAL VISIT (OUTPATIENT)
Dept: PSYCHIATRY | Age: 21
End: 2021-01-06
Payer: MEDICAID

## 2021-01-06 DIAGNOSIS — F41.1 GAD (GENERALIZED ANXIETY DISORDER): ICD-10-CM

## 2021-01-06 DIAGNOSIS — F12.10 CANNABIS USE DISORDER, MILD, ABUSE: ICD-10-CM

## 2021-01-06 DIAGNOSIS — F33.2 SEVERE EPISODE OF RECURRENT MAJOR DEPRESSIVE DISORDER, WITHOUT PSYCHOTIC FEATURES (HCC): Primary | ICD-10-CM

## 2021-01-06 PROCEDURE — 99204 OFFICE O/P NEW MOD 45 MIN: CPT | Performed by: REGISTERED NURSE

## 2021-01-06 NOTE — PROGRESS NOTES
This note will not be viewable in [employer] for the following reason(s). This is a Psychotherapy Note. [id number] [address] [facility] [address] [address]  Dept: [phone]  Dept Fax: [phone]  Loc: [phone]    Visit Date: 1/6/2021    SUBJECTIVE DATA     CHIEF COMPLAINT:    Chief Complaint   Patient presents with    Anxiety    Depression       History obtained from: patient    HISTORY OF PRESENT ILLNESS:    Meera Rene is a 21 y.o. female who presents to the office referred by Dr. Shakir Nelson, PCP, for increasing depression and anxiety. HPI    Depression  Feeling really sad and down since ~15years old  Describes it as \"a deep sadness\"  Feels worthless and as if she doesn't deserve things   Feelings of hopelessness and helplessness  Negative self-talk  Having trouble falling asleep and staying asleep  Racing thoughts can affect sleep--sometimes it's something that's just on her mind that she's thinking intently about or something that happened and she replays over and over in her mind  Has been feeling excessive guilt  Appetite has decreased quite a bit  Endorses anhedonia  Depression started a few months ago; has worsened over the past few weeks    Anxiety  Hard to talk about feelings with people in her life which makes her anxious  First started feeling anxious a few years ago  Reports excessive worry and anxiety that are difficult to control, cause significant distress and impairment, and occur on more days than not for at least six months  Reports experiencing only one panic attack about a year ago over a very traumatic breakup    Stressors  1. \"big mgmt change at work\". She felt as thought her previous manager really trusted and respected her.  When new manager started, she felt that they do not like her and that they don't trust her to do her work 2. has been experiencing some issues in relationship with her boyfriend---whom she lives with--because he is having trouble understanding what she is going through right now  3. Depression has impacted her sex drive--she and her boyfriend have been arguing about it lately  3. Boyfriend is becoming increasingly irritable and abrupt with her    Trauma  Dad was arrested in front of her when she was younger  A very traumatic break-up made her want to stay in bed for 2 weeks    Medications  Started on Lexapro 10mg 2 weeks ago--reports anxiety has been improving; \"still not sure if it's helping with the depression, but I have felt better the past two days\"        Suicidal ideations started \"a long time ago\", I have never wanted to act on the feelings. Reports she would \"definitely\" be able and willing to seek help if she ever did develop a plan or felt she couldn't deal with the urges  Brother attempted suicide by hanging when Zak was ~15years-old. Nearly completed suicide but rope broke ; \"I can still remember the way it made me feel and I promised myself I would never make anyone feel that way\"    Denies current thoughts to harm self or others  Denies manic symptoms. Denies hallucinations and delusional thinking. Reports marijuana use \"a few times a week\"; denies AoD      PSYCHIATRIC HISTORY:  Patient has had prior care with the following:     [] Psychiatrist    [] Psychologist    [x] Other Walker Mention took her to see a therapist after dad was arrested; only went the one time    [] None    The patient has had 0 lifetime suicide attempts. Patient reports 0 psych hospital admissions.     Past psychiatric medications include: Lexapro    Adverse reactionsfrom psychotropic medications:  none      Lifetime Psychiatric Review of Systems         Breanne or Hypomania:  no     Panic Attacks:  no     Phobias:  no     Obsessions and Compulsions:  no     Body or Vocal Tics:  no     Hallucinations:  no Delusions:  no    SOCIAL HISTORY:  Patient was born in [facility] and raised by her mother      Social History     Socioeconomic History    Marital status: Single     Spouse name: Not on file    Number of children: 0    Years of education: Not on file    Highest education level: High school graduate   Occupational History    Not on file   Social Needs    Financial resource strain: Not hard at all   [place] insecurity     Worry: Never true     Inability: Never true   [employer] needs     Medical: No     Non-medical: No   Tobacco Use    Smoking status: Never Smoker    Smokeless tobacco: Never Used   Substance and Sexual Activity    Alcohol use: Not Currently    Drug use: Yes     Types: Methamphetamines    Sexual activity: Yes     Partners: Male   Lifestyle    Physical activity     Days per week: Not on file     Minutes per session: Not on file    Stress: Not on file   Relationships    Social connections     Talks on phone: Not on file     Gets together: Not on file     Attends Muslim service: Not on file     Active member of club or organization: Not on file     Attends meetings of clubs or organizations: Not on file     Relationship status: Not on file    Intimate partner violence     Fear of current or ex partner: Not on file     Emotionally abused: Not on file     Physically abused: Not on file     Forced sexual activity: Not on file   Other Topics Concern    Not on file   Social History Narrative    Not on file     Lives with boyfriend and two roommates--\"things have been a little rough\"; Zak is trying to [place] like myself, but haven't been feeling like doing anything\"  Started feeling \"a little more like myself\" the past couple of days      FAMILY HISTORY:   No family history on file.     Psychiatric Family History  Depression and anxiety in mom and sister--both take Lexapro  Depression in dad    PAST MEDICAL HISTORY:    Past Medical History:   Diagnosis Date    Cysts of both ovaries 1. Severe episode of recurrent major depressive disorder, without psychotic features ([facility] [address]    2. ASIF (generalized anxiety disorder)    3. Cannabis use disorder, mild, abuse        PLAN   Follow-up:  Return in about 4 weeks (around 2/3/2021) for medication management, follow-up. Recommend therapy    Continue 10mg Lexapro daily as Hugh Montejo believes it has been helpful for anxiety, she has been taking it for only 2 weeks and will likely take more time to see change in depressive symptoms, both her mother and sister are being treated with it successfully      Prescriptions for this encounter:  New Prescriptions    No medications on file       No orders of the defined types were placed in this encounter. There are no discontinued medications. Additional orders:  No orders of the defined types were placed in this encounter. Risks, potential side effects, possibledrug-drug interactions, benefits and alternate treatments discussed in detail. All questions answered. Patient stated understanding and is agreeable to treatment plan. Patient has been instructed to seek emergency help via the emergency and/or calling 911 should symptoms become severe, worsen, or with other concerning symptoms. Patient instructed to goimmediately to the emergency room and/or call 911 with any suicidal or homicidal ideations or if audio/visual hallucinations develop  Patient stated understanding and agrees. Patient given crisis center information. I spent a total of 45 minutes with the patient and over half of that time was spent on counseling and coordination of care regarding topics discussed above.     Provider Signature:  Electronically signed by KARINE Howard CNP on 1/6/2021 at 2:05 PM Nunu Farris is a 21 y.o. female being evaluated by a Virtual Visit (video visit) encounter to address concerns as mentioned above. A caregiver was present when appropriate. Due to this being a TeleHealth encounter ([facility]), evaluation of the following organ systems was limited: Vitals/Constitutional/EENT/Resp/CV/GI//MS/Neuro/Skin/Heme-Lymph-Imm. Pursuant to the emergency declaration under the [address] [place] [address] and the [employer] Act, this Virtual Visit was conducted with patient's (and/or legal guardian's) consent, to reduce the patient's risk of exposure to COVID-19 and provide necessary medical care. The patient (and/or legal guardian) has also been advised to contact this office for worsening conditions or problems, and seek emergency medical treatment and/or call 911 if deemed necessary. Patient identification was verified at the start of the visit: Yes      Services were provided through a video synchronous discussion virtually to substitute for in-person clinic visit. Patient and provider were located at their individual homes. --Sukhi Brewer, KARINE - CNP on 1/6/2021 at 2:05 PM    An electronic signature was used to authenticate this note.

## 2021-01-25 ENCOUNTER — OFFICE VISIT (OUTPATIENT)
Dept: FAMILY MEDICINE CLINIC | Age: 21
End: 2021-01-25
Payer: MEDICAID

## 2021-01-25 VITALS
SYSTOLIC BLOOD PRESSURE: 116 MMHG | OXYGEN SATURATION: 99 % | BODY MASS INDEX: 20.49 KG/M2 | DIASTOLIC BLOOD PRESSURE: 76 MMHG | HEIGHT: 65 IN | RESPIRATION RATE: 16 BRPM | TEMPERATURE: 97.3 F | WEIGHT: 123 LBS | HEART RATE: 80 BPM

## 2021-01-25 DIAGNOSIS — M25.562 ACUTE PAIN OF LEFT KNEE: ICD-10-CM

## 2021-01-25 DIAGNOSIS — Z23 NEED FOR TDAP VACCINATION: ICD-10-CM

## 2021-01-25 DIAGNOSIS — F33.9 EPISODE OF RECURRENT MAJOR DEPRESSIVE DISORDER, UNSPECIFIED DEPRESSION EPISODE SEVERITY (HCC): Primary | ICD-10-CM

## 2021-01-25 DIAGNOSIS — R35.0 FREQUENCY OF URINATION: ICD-10-CM

## 2021-01-25 DIAGNOSIS — Z23 NEED FOR HPV VACCINE: ICD-10-CM

## 2021-01-25 LAB
BILIRUBIN URINE: NEGATIVE
BLOOD URINE, POC: NEGATIVE
CHARACTER, URINE: CLEAR
COLOR, URINE: YELLOW
GLUCOSE URINE: NEGATIVE MG/DL
KETONES, URINE: NEGATIVE
LEUKOCYTE CLUMPS, URINE: NEGATIVE
NITRITE, URINE: NEGATIVE
PH, URINE: 5.5 (ref 5–9)
PROTEIN, URINE: NEGATIVE MG/DL
SPECIFIC GRAVITY, URINE: >= 1.03 (ref 1–1.03)
UROBILINOGEN, URINE: 0.2 EU/DL (ref 0–1)

## 2021-01-25 PROCEDURE — 90715 TDAP VACCINE 7 YRS/> IM: CPT | Performed by: FAMILY MEDICINE

## 2021-01-25 PROCEDURE — G8420 CALC BMI NORM PARAMETERS: HCPCS | Performed by: STUDENT IN AN ORGANIZED HEALTH CARE EDUCATION/TRAINING PROGRAM

## 2021-01-25 PROCEDURE — 1036F TOBACCO NON-USER: CPT | Performed by: STUDENT IN AN ORGANIZED HEALTH CARE EDUCATION/TRAINING PROGRAM

## 2021-01-25 PROCEDURE — 90471 IMMUNIZATION ADMIN: CPT | Performed by: FAMILY MEDICINE

## 2021-01-25 PROCEDURE — 99214 OFFICE O/P EST MOD 30 MIN: CPT | Performed by: STUDENT IN AN ORGANIZED HEALTH CARE EDUCATION/TRAINING PROGRAM

## 2021-01-25 PROCEDURE — 81003 URINALYSIS AUTO W/O SCOPE: CPT | Performed by: FAMILY MEDICINE

## 2021-01-25 PROCEDURE — G8427 DOCREV CUR MEDS BY ELIG CLIN: HCPCS | Performed by: STUDENT IN AN ORGANIZED HEALTH CARE EDUCATION/TRAINING PROGRAM

## 2021-01-25 PROCEDURE — G8484 FLU IMMUNIZE NO ADMIN: HCPCS | Performed by: STUDENT IN AN ORGANIZED HEALTH CARE EDUCATION/TRAINING PROGRAM

## 2021-01-25 RX ORDER — ESCITALOPRAM OXALATE 20 MG/1
20 TABLET ORAL DAILY
Qty: 30 TABLET | Refills: 3 | Status: SHIPPED | OUTPATIENT
Start: 2021-01-25 | End: 2021-03-10 | Stop reason: SDUPTHER

## 2021-01-25 ASSESSMENT — ENCOUNTER SYMPTOMS
RHINORRHEA: 0
SHORTNESS OF BREATH: 0
EYE REDNESS: 0
DIARRHEA: 0
BACK PAIN: 0
ABDOMINAL PAIN: 0
CONSTIPATION: 0
COUGH: 0

## 2021-01-25 NOTE — PROGRESS NOTES
Immunizations Administered     Name Date Dose Route    Tdap (Boostrix, Adacel) 1/25/2021 0.5 mL Intramuscular    Site: Deltoid- Left    Lot: [id number]    NDC: 93595-105-82      CLINICAL PHARMACY CONSULT: MED RECONCILIATION/REVIEW ADDENDUM    For Pharmacy Admin Tracking Only    PHSO: No  Total # of Interventions Recommended: 1  Total Interventions Accepted: 1  Time Spent (min): 5    Saloni Sewell, PharmD

## 2021-01-25 NOTE — PROGRESS NOTES
S: 21 y.o. female with   Chief Complaint   Patient presents with    1 Month Follow-Up    Knee Pain     x2 weeks. no injury left knee        HPI: depression:  Doing much better. Much improved. But still some improvement is necessary. Left knee pain:  No injury. Gradually worsening x 1 month. Works in a warehouse. Lots of bending. Worse after a long. Day ibuprofen helps. Ace bandage helps. COVID symptoms completely resolved. Urinary frequency. Some back pain. Suprapubic pressure. BP Readings from Last 3 Encounters:   01/25/21 116/76   12/21/20 102/68   12/07/20 (!) 102/58     Wt Readings from Last 3 Encounters:   01/25/21 123 lb (55.8 kg)   12/21/20 126 lb (57.2 kg)   12/03/20 118 lb (53.5 kg)           O: VS:  height is 5' 5.16\" (1.655 m) and weight is 123 lb (55.8 kg). Her skin temperature is 97.3 °F (36.3 °C). Her blood pressure is 116/76 and her pulse is 80. Her respiration is 16 and oxygen saturation is 99%. AAO/NAD, appropriate affect for mood     Diagnosis Orders   1. Episode of recurrent major depressive disorder, unspecified depression episode severity (HCC)  escitalopram (LEXAPRO) 20 MG tablet   2. Frequency of urination  POCT Urinalysis No Micro (Auto)   3. Acute pain of left knee     4. Need for Tdap vaccination  Tdap (age 6y and older) IM ([address])   5. Need for HPV vaccine  HPV vaccine 9-valent IM (GARDASIL 9)       Plan:  Increase lexapro to 20mg daily. F/u 1 month. RICE therapy for knee pain and ibuprofen. HCM- start hpv series today and tetanus. Health Maintenance Due   Topic Date Due    Hepatitis C screen  2000    Varicella vaccine (1 of 2 - 2-dose childhood series) 06/18/2001    HPV vaccine (1 - 2-dose series) 06/18/2011    HIV screen  06/18/2015    Chlamydia screen  06/18/2016    Flu vaccine (1) 09/01/2020         Attending Physician Statement  I have discussed the case, including pertinent history and exam findings with the resident.  I also have seen the patient and performed key portions of the examination. I agree with the documented assessment and plan as documented by the resident.         Sara Pérez DO 1/25/2021 11:37 AM

## 2021-01-25 NOTE — PROGRESS NOTES
Saeid Thomas is a 21 y.o. female who presents today for:  Chief Complaint   Patient presents with    1 Month Follow-Up    Knee Pain     x2 weeks. no injury left knee        Goals    None       HPI:     HPI    Cc: follow up on meds/ s/p COVID infection    Chest pain/SOB  COVID positive on 12/3/2020  She notes symptoms of shortness of breath first started around November 30th. Short of breath with exertion- no longer occuring  She notes no further lightheadedness and dizziness  Still taking vitamin C and elderberry  No fevers. No cough. Denies cramping/swelling in her legs. Chest pain midsternal and radiates to left side, she states it feels tight and relaxes when breathes out. She notes she will have \"attacks\" that will go away on its own if she stops exerting herself. Marijuana Use:  Smokes marijuana 2-3 a day for 2 years. HM:  HPV vaccine ([facility]) patient unsure when she got this, but well over one month ago  She would like to get next appointment to start series  Flu vaccine has not had- she does not want the flu vaccine  Tetanus booster- agreeable to this today  Lipid panel-agreeable ( will get lab work today)    Depression  Patient notes that since 7th grade she has had issues with depression. She states it worsens during winter or when things are stressful. She recently has been having depression for over 2 months now. Denies SI  We started Lexapro on 12/21    She complains of the following:  Poor sleep- improvement   Concentration good  Decreased appetite- no change  psychomotor retardation- improvement   Fatigue- some improvement   Depressed mood & loss of interest- improved (\"want to go out and do stuff more\")  Hopelessness- \"still feels same here\"    Had some side effects with medication like upset stomach/diarrhea/some insomnia, but resolved after 2 weeks.     Patient did not get TSH, Lipid Panel and Varicella IgG done (lab work ordered on 12/21)    Lactose intolerant tries to avoid dairy. Urinary Symptoms:  Symptoms started two days ago  Higher right upper back pain, nausea, urinary frequency. Denies fevers. denies dysuria. Denies vaginal discharge    Left Knee pain:  1 month duration, gradual  [place]. Worse after exercise/work  Took some ibuprofen which helped  Using ace compression bandage  Denies injury/traumatic event      No question data found. Past Medical History:   Diagnosis Date    Cysts of both ovaries       Past Surgical History:   Procedure Laterality Date    TONSILLECTOMY AND ADENOIDECTOMY      TYMPANOSTOMY TUBE PLACEMENT       Family History   Problem Relation Age of Onset    Breast Cancer Maternal Aunt     Diabetes Maternal Grandmother     Diabetes Paternal Grandmother      Social History     Tobacco Use    Smoking status: Never Smoker    Smokeless tobacco: Never Used   Substance Use Topics    Alcohol use: Not Currently      Current Outpatient Medications   Medication Sig Dispense Refill    escitalopram (LEXAPRO) 20 MG tablet Take 1 tablet by mouth daily 30 tablet 3    ibuprofen (IBU) 400 MG tablet Take 1 tablet by mouth every 6 hours as needed for Pain 20 tablet 0    SPRINTEC 28 0.25-35 MG-MCG per tablet TAKE 1 TABLET BY MOUTH EVERY DAY       No current facility-administered medications for this visit. No Known Allergies    Health Maintenance   Topic Date Due    Hepatitis C screen  2000    Varicella vaccine (1 of 2 - 2-dose childhood series) 06/18/2001    HPV vaccine (1 - 2-dose series) 06/18/2011    HIV screen  06/18/2015    Chlamydia screen  06/18/2016    Flu vaccine (1) 09/01/2020    DTaP/Tdap/Td vaccine (2 - Td) 01/25/2031    Hepatitis A vaccine  Aged Out    Hepatitis B vaccine  Aged Out    Hib vaccine  Aged Out    Meningococcal (ACWY) vaccine  Aged Out    Pneumococcal 0-64 years Vaccine  Aged Out       Subjective:      Review of Systems   Constitutional: Negative for chills and fever.    HENT: Negative Effort: Pulmonary effort is normal. No respiratory distress. Breath sounds: Normal breath sounds. No wheezing, rhonchi or rales. Abdominal:      Palpations: Abdomen is soft. Tenderness: There is no abdominal tenderness. There is no guarding or rebound. Musculoskeletal: Normal range of motion. Right lower leg: No edema. Left lower leg: No edema. Skin:     General: Skin is warm and dry. Neurological:      General: No focal deficit present. Mental Status: She is alert. Psychiatric:         Attention and Perception: Attention and perception normal.         Mood and Affect: Mood normal. Affect is flat. Behavior: Behavior normal. Behavior is cooperative. Thought Content: Thought content normal.         Cognition and Memory: Cognition and memory normal.         Judgment: Judgment normal.           Lab Results   Component Value Date    WBC 6.0 12/03/2020    HGB 13.9 12/03/2020    HCT 41.7 12/03/2020     12/03/2020    AST 19 06/21/2018     12/03/2020    K 3.9 12/03/2020     12/03/2020    CREATININE 0.6 12/03/2020    BUN 10 12/03/2020    CO2 22 (L) 12/03/2020    LABGLOM >90 12/03/2020    CALCIUM 9.0 12/03/2020       Imaging Results:    Xr Chest Portable    Result Date: 12/3/2020  PROCEDURE: XR CHEST PORTABLE CLINICAL INFORMATION: Chest pain COMPARISON: No prior study. TECHNIQUE:  AP mobile chest single view  FINDINGS: The heart size is normal. No focal consolidation, pleural effusion or pneumothorax is seen. No acute osseous findings are demonstrated. 1. No acute cardiopulmonary findings. **This report has been created using voice recognition software. It may contain minor errors which are inherent in voice recognition technology. ** Final report electronically signed by Dr. Santo Al on 12/3/2020 10:47 AM        Assessment / Plan:     1.  Episode of recurrent major depressive disorder, unspecified depression episode severity ([facility] [address]  - escitalopram (LEXAPRO) 20 MG tablet; Take 1 tablet by mouth daily  Dispense: 30 tablet; Refill: 3  Increased from 10 mg- patient had some side effects previously    2. Frequency of urination  - POCT Urinalysis No Micro (Auto)- NEGATIVE  Patient low spec gravity. Encouraged drink more water    3. Acute pain of left knee  Rest, Ice Compress and elevate  Use NSAID as needed for pain control and reduce inflammation  No need for imaging at this point  Suspect tendonitis    4. Need for Tdap vaccination  - Tdap (age 6y and older) IM ([address])    5. Need for HPV vaccine  - HPV vaccine 9-valent IM (GARDASIL 9); Future  Will need to restart at next appointment because first dose was well over one month ago at [facility]    Patient did not get TSH, Lipid Panel and Varicella IgG done (lab work ordered on 12/21)         Return in about 5 weeks (around 3/1/2021) for meds depression FU. Medications Prescribed:  Orders Placed This Encounter   Medications    DISCONTD: Tetanus-Diphth-Acell Pertussis ([address]) 5-2.5-18.5 LF-MCG/0.5 injection     Sig: Inject 0.5 mLs into the muscle once for 1 dose     Dispense:  0.5 mL     Refill:  0    escitalopram (LEXAPRO) 20 MG tablet     Sig: Take 1 tablet by mouth daily     Dispense:  30 tablet     Refill:  3       Future Appointments   Date Time Provider Tammy Reyes   2/3/2021  2:00 PM KARINE Diaz - CHARITY Cristal Eisenmenger [facility] [address] [facility]   3/5/2021  9:20 AM Elsa Fletcher DO SRPX  RES [facility] - [address] [facility]       Patient given educational materials - see patient instructions. Discussed use, benefit, and side effects of prescribed medications. All patient questions answered. Patient voiced understanding. Reviewed health maintenance. Instructed to continue current medications, diet and exercise. Patient agreed with treatment plan. Follow up as directed.      Electronically signed by Elsa Fletcher DO on 1/25/2021 at 10:52 AM

## 2021-01-25 NOTE — PROGRESS NOTES
Health Maintenance Due   Topic Date Due    Hepatitis C screen  2000    Varicella vaccine (1 of 2 - 2-dose childhood series) 06/18/2001 Patient vaccinated    HPV vaccine (1 - 2-dose series) 06/18/2011 patient started the series    HIV screen  06/18/2015    Chlamydia screen  06/18/2016    DTaP/Tdap/Td vaccine (1 - Tdap) 06/18/2019 patient thinks she is due.  Flu vaccine (1) 09/01/2020 Patient declined.

## 2021-02-01 ENCOUNTER — HOSPITAL ENCOUNTER (EMERGENCY)
Age: 21
Discharge: HOME | End: 2021-02-02
Attending: EMERGENCY MEDICINE
Payer: OTHER MISCELLANEOUS

## 2021-02-01 ENCOUNTER — APPOINTMENT (OUTPATIENT)
Dept: GENERAL RADIOLOGY | Age: 21
End: 2021-02-01
Payer: OTHER MISCELLANEOUS

## 2021-02-01 DIAGNOSIS — S80.01XA CONTUSION OF RIGHT KNEE, INITIAL ENCOUNTER: ICD-10-CM

## 2021-02-01 DIAGNOSIS — S39.012A STRAIN OF LUMBAR REGION, INITIAL ENCOUNTER: ICD-10-CM
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
## 2021-02-01 DIAGNOSIS — S29.019A THORACIC MYOFASCIAL STRAIN, INITIAL ENCOUNTER: ICD-10-CM

## 2021-02-01 PROCEDURE — 99284 EMERGENCY DEPT VISIT MOD MDM: CPT

## 2021-02-01 PROCEDURE — 72170 X-RAY EXAM OF PELVIS: CPT

## 2021-02-01 PROCEDURE — 72040 X-RAY EXAM NECK SPINE 2-3 VW: CPT

## 2021-02-01 PROCEDURE — 72072 X-RAY EXAM THORAC SPINE 3VWS: CPT

## 2021-02-01 PROCEDURE — 73564 X-RAY EXAM KNEE 4 OR MORE: CPT

## 2021-02-01 PROCEDURE — 72100 X-RAY EXAM L-S SPINE 2/3 VWS: CPT

## 2021-02-01 ASSESSMENT — PAIN DESCRIPTION - LOCATION: LOCATION: BACK;KNEE

## 2021-02-01 NOTE — LETTER
[address] [facility] [address] EMERGENCY DEPT  [address] [place] [id number]  Phone: [phone]               February 2, 2021    Patient: Abby Plunkett   YOB: 2000   Date of Visit: 2/1/2021       To Whom It May Concern:    Arik Verdin was seen and treated in our emergency department on 2/1/2021. She may return to work on 02/03/2021.       Sincerely,       ED RN SUPERVISOR        Signature:__________________________________

## 2021-02-02 VITALS
HEART RATE: 76 BPM | RESPIRATION RATE: 12 BRPM | OXYGEN SATURATION: 100 % | DIASTOLIC BLOOD PRESSURE: 57 MMHG | TEMPERATURE: 97.6 F | SYSTOLIC BLOOD PRESSURE: 111 MMHG

## 2021-02-02 RX ORDER — IBUPROFEN 400 MG/1
400 TABLET ORAL EVERY 6 HOURS PRN
Qty: 120 TABLET | Refills: 0 | Status: SHIPPED | OUTPATIENT
Start: 2021-02-02 | End: 2022-01-21

## 2021-02-02 ASSESSMENT — ENCOUNTER SYMPTOMS
EYE REDNESS: 0
TROUBLE SWALLOWING: 0
ABDOMINAL DISTENTION: 0
SINUS PRESSURE: 0
EYE PAIN: 0
SHORTNESS OF BREATH: 0
COUGH: 0
CONSTIPATION: 0
DIARRHEA: 0
CHOKING: 0
WHEEZING: 0
EYE ITCHING: 0
VOICE CHANGE: 0
BLOOD IN STOOL: 0
SORE THROAT: 0
EYE DISCHARGE: 0
ABDOMINAL PAIN: 0
RHINORRHEA: 0
PHOTOPHOBIA: 0
NAUSEA: 0
BACK PAIN: 1
VOMITING: 0
CHEST TIGHTNESS: 0

## 2021-02-02 ASSESSMENT — PAIN SCALES - GENERAL: PAINLEVEL_OUTOF10: 5
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
## 2021-02-02 NOTE — ED NOTES
Bed: 011A  Expected date: 2/1/21  Expected time: 10:30 PM  Means of arrival: [facility]  Comments:     Nicolasa Verdin RN  02/01/21 [id number]

## 2021-02-02 NOTE — ED NOTES
Pt in room resting and speaking on the phone with grandma. Vitals and pain reassessed. Pt taken to the bathroom. Denies any further needs at this time. Call light in reach.      Eileen Hannah  02/02/21 0010
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
## 2021-02-03 ENCOUNTER — VIRTUAL VISIT (OUTPATIENT)
Dept: PSYCHIATRY | Age: 21
End: 2021-02-03
Payer: MEDICAID

## 2021-02-03 DIAGNOSIS — F33.0 MDD (MAJOR DEPRESSIVE DISORDER), RECURRENT EPISODE, MILD (HCC): ICD-10-CM

## 2021-02-03 DIAGNOSIS — F41.1 GENERALIZED ANXIETY DISORDER: ICD-10-CM

## 2021-02-03 PROCEDURE — 99214 OFFICE O/P EST MOD 30 MIN: CPT | Performed by: REGISTERED NURSE

## 2021-02-03 ASSESSMENT — PATIENT HEALTH QUESTIONNAIRE - PHQ9
10. IF YOU CHECKED OFF ANY PROBLEMS, HOW DIFFICULT HAVE THESE PROBLEMS MADE IT FOR YOU TO DO YOUR WORK, TAKE CARE OF THINGS AT HOME, OR GET ALONG WITH OTHER PEOPLE: 1
8. MOVING OR SPEAKING SO SLOWLY THAT OTHER PEOPLE COULD HAVE NOTICED. OR THE OPPOSITE, BEING SO FIGETY OR RESTLESS THAT YOU HAVE BEEN MOVING AROUND A LOT MORE THAN USUAL: 0
2. FEELING DOWN, DEPRESSED OR HOPELESS: 1
7. TROUBLE CONCENTRATING ON THINGS, SUCH AS READING THE NEWSPAPER OR WATCHING TELEVISION: 0
9. THOUGHTS THAT YOU WOULD BE BETTER OFF DEAD, OR OF HURTING YOURSELF: 0
SUM OF ALL RESPONSES TO PHQ9 QUESTIONS 1 & 2: 3
6. FEELING BAD ABOUT YOURSELF - OR THAT YOU ARE A FAILURE OR HAVE LET YOURSELF OR YOUR FAMILY DOWN: 2
5. POOR APPETITE OR OVEREATING: 1

## 2021-02-03 ASSESSMENT — ANXIETY QUESTIONNAIRES
4. TROUBLE RELAXING: 1-SEVERAL DAYS
GAD7 TOTAL SCORE: 4
2. NOT BEING ABLE TO STOP OR CONTROL WORRYING: 1-SEVERAL DAYS
1. FEELING NERVOUS, ANXIOUS, OR ON EDGE: 1-SEVERAL DAYS
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
## 2021-03-05 ENCOUNTER — OFFICE VISIT (OUTPATIENT)
Dept: FAMILY MEDICINE CLINIC | Age: 21
End: 2021-03-05
Payer: MEDICAID

## 2021-03-05 VITALS
OXYGEN SATURATION: 99 % | HEIGHT: 65 IN | BODY MASS INDEX: 19.89 KG/M2 | RESPIRATION RATE: 12 BRPM | DIASTOLIC BLOOD PRESSURE: 58 MMHG | HEART RATE: 82 BPM | TEMPERATURE: 96.8 F | WEIGHT: 119.4 LBS | SYSTOLIC BLOOD PRESSURE: 102 MMHG

## 2021-03-05 DIAGNOSIS — M25.569 ACUTE KNEE PAIN, UNSPECIFIED LATERALITY: ICD-10-CM

## 2021-03-05 DIAGNOSIS — F33.9 EPISODE OF RECURRENT MAJOR DEPRESSIVE DISORDER, UNSPECIFIED DEPRESSION EPISODE SEVERITY (HCC): Primary | ICD-10-CM

## 2021-03-05 DIAGNOSIS — Z23 IMMUNIZATION DUE: ICD-10-CM

## 2021-03-05 DIAGNOSIS — J30.1 SEASONAL ALLERGIC RHINITIS DUE TO POLLEN: ICD-10-CM

## 2021-03-05 PROCEDURE — G8420 CALC BMI NORM PARAMETERS: HCPCS | Performed by: STUDENT IN AN ORGANIZED HEALTH CARE EDUCATION/TRAINING PROGRAM

## 2021-03-05 PROCEDURE — 1036F TOBACCO NON-USER: CPT | Performed by: STUDENT IN AN ORGANIZED HEALTH CARE EDUCATION/TRAINING PROGRAM

## 2021-03-05 PROCEDURE — 90471 IMMUNIZATION ADMIN: CPT | Performed by: FAMILY MEDICINE

## 2021-03-05 PROCEDURE — G8484 FLU IMMUNIZE NO ADMIN: HCPCS | Performed by: STUDENT IN AN ORGANIZED HEALTH CARE EDUCATION/TRAINING PROGRAM

## 2021-03-05 PROCEDURE — 99214 OFFICE O/P EST MOD 30 MIN: CPT | Performed by: STUDENT IN AN ORGANIZED HEALTH CARE EDUCATION/TRAINING PROGRAM

## 2021-03-05 PROCEDURE — 90651 9VHPV VACCINE 2/3 DOSE IM: CPT | Performed by: FAMILY MEDICINE

## 2021-03-05 PROCEDURE — G8427 DOCREV CUR MEDS BY ELIG CLIN: HCPCS | Performed by: STUDENT IN AN ORGANIZED HEALTH CARE EDUCATION/TRAINING PROGRAM
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
## 2021-03-05 NOTE — PROGRESS NOTES
today in office    No question data found. Past Medical History:   Diagnosis Date    Cysts of both ovaries       Past Surgical History:   Procedure Laterality Date    TONSILLECTOMY AND ADENOIDECTOMY      TYMPANOSTOMY TUBE PLACEMENT       Family History   Problem Relation Age of Onset    Breast Cancer Maternal Aunt     Diabetes Maternal Grandmother     Diabetes Paternal Grandmother      Social History     Tobacco Use    Smoking status: Never Smoker    Smokeless tobacco: Never Used   Substance Use Topics    Alcohol use: Not Currently      Current Outpatient Medications   Medication Sig Dispense Refill    ibuprofen (IBU) 400 MG tablet Take 1 tablet by mouth every 6 hours as needed for Pain 120 tablet 0    escitalopram (LEXAPRO) 20 MG tablet Take 1 tablet by mouth daily 30 tablet 3    SPRINTEC 28 0.25-35 MG-MCG per tablet TAKE 1 TABLET BY MOUTH EVERY DAY       No current facility-administered medications for this visit. No Known Allergies    Health Maintenance   Topic Date Due    Hepatitis C screen  Never done    HIV screen  Never done    Chlamydia screen  Never done    Flu vaccine (1) 09/01/2020    DTaP/Tdap/Td vaccine (8 - Td) 01/25/2031    Hepatitis A vaccine  Completed    Hepatitis B vaccine  Completed    Hib vaccine  Completed    HPV vaccine  Completed    Varicella vaccine  Completed    Meningococcal (ACWY) vaccine  Completed    Pneumococcal 0-64 years Vaccine  Aged Out       Subjective:      See HPI    Objective:     Vitals:    03/05/21 0925   BP: (!) 102/58   Pulse: 82   Resp: 12   Temp: 96.8 °F (36 °C)   SpO2: 99%   Weight: 119 lb 6.4 oz (54.2 kg)   Height: 5' 5\" (1.651 m)     Body mass index is 19.87 kg/m².     Wt Readings from Last 3 Encounters:   03/05/21 119 lb 6.4 oz (54.2 kg)   01/25/21 123 lb (55.8 kg)   12/21/20 126 lb (57.2 kg)     BP Readings from Last 3 Encounters:   03/05/21 (!) 102/58   02/02/21 (!) 111/57   01/25/21 116/76       Physical Exam  Vitals signs and nursing note reviewed. Constitutional:       General: She is not in acute distress. Appearance: She is not ill-appearing, toxic-appearing or diaphoretic. HENT:      Head: Normocephalic and atraumatic. Right Ear: Tympanic membrane, ear canal and external ear normal.      Left Ear: Tympanic membrane, ear canal and external ear normal.      Nose: Nose normal. No congestion or rhinorrhea. Mouth/Throat:      Mouth: Mucous membranes are moist.      Pharynx: Oropharynx is clear. No oropharyngeal exudate or posterior oropharyngeal erythema. Eyes:      Conjunctiva/sclera: Conjunctivae normal.   Neck:      Musculoskeletal: Normal range of motion. Cardiovascular:      Rate and Rhythm: Normal rate and regular rhythm. Pulses: Normal pulses. Heart sounds: Normal heart sounds. Comments: No peripheral erythema. Lower legs symmetrical.  Pulmonary:      Effort: Pulmonary effort is normal. No respiratory distress. Breath sounds: Normal breath sounds. No wheezing, rhonchi or rales. Abdominal:      Palpations: Abdomen is soft. Tenderness: There is no abdominal tenderness. There is no guarding or rebound. Musculoskeletal: Normal range of motion. Right lower leg: No edema. Left lower leg: No edema. Comments: Some tenderness over right knee patella and infrapatellar. Wearing compression ace bandage. Some edema in comparison to left knee   Skin:     General: Skin is warm and dry. Neurological:      General: No focal deficit present. Mental Status: She is alert. Psychiatric:         Attention and Perception: Attention and perception normal.         Mood and Affect: Mood and affect normal.         Behavior: Behavior normal. Behavior is cooperative. Thought Content:  Thought content normal.         Cognition and Memory: Cognition and memory normal.         Judgment: Judgment normal.      Comments: Patient is able to smile more, mood and affect appears more appropriate than last visit           Lab Results   Component Value Date    WBC 6.0 12/03/2020    HGB 13.9 12/03/2020    HCT 41.7 12/03/2020     12/03/2020    AST 19 06/21/2018     12/03/2020    K 3.9 12/03/2020     12/03/2020    CREATININE 0.6 12/03/2020    BUN 10 12/03/2020    CO2 22 (L) 12/03/2020    LABGLOM >90 12/03/2020    CALCIUM 9.0 12/03/2020       Imaging Results:    Xr Chest Portable    Result Date: 12/3/2020  PROCEDURE: XR CHEST PORTABLE CLINICAL INFORMATION: Chest pain COMPARISON: No prior study. TECHNIQUE:  AP mobile chest single view  FINDINGS: The heart size is normal. No focal consolidation, pleural effusion or pneumothorax is seen. No acute osseous findings are demonstrated. 1. No acute cardiopulmonary findings. **This report has been created using voice recognition software. It may contain minor errors which are inherent in voice recognition technology. ** Final report electronically signed by Dr. Rajni Dhillon on 12/3/2020 10:47 AM        Assessment / Plan:     1. Episode of recurrent major depressive disorder, unspecified depression episode severity ([facility] [address]  Patient notes she is happy with the current dose of the Lexapro. I believe she is well controlled at this dose  She is going to counseling once a month     2. Acute knee pain, unspecified laterality  Right knee  Discussed using NSAIDs, RICE    3. Seasonal allergic rhinitis due to pollen  Encouraged using normal saline rinses or flonase    4. Immunization due  - HPV vaccine 9-valent IM (GARDASIL 9)  Stressed importance of returning in one month for second dose of series       Return in about 1 month (around 4/5/2021), or (6 months for Depression follow up), for NURSE VISIT ONLY: 2nd vaccine HPV. Medications Prescribed:  No orders of the defined types were placed in this encounter.       Future Appointments   Date Time Provider Tammy Reyes   4/7/2021  9:00 AM SCHEDULE, [facility]/MA VISIT [facility] - Katy Burrell Patient given educational materials - see patient instructions. Discussed use, benefit, and side effects of prescribed medications. All patient questions answered. Patient voiced understanding. Reviewed health maintenance. Instructed to continue current medications, diet and exercise. Patient agreed with treatment plan. Follow up as directed.      Electronically signed by Missy Churchill DO on 3/5/2021 at 1:40 PM

## 2021-03-10 ENCOUNTER — VIRTUAL VISIT (OUTPATIENT)
Dept: PSYCHIATRY | Age: 21
End: 2021-03-10
Payer: MEDICAID

## 2021-03-10 DIAGNOSIS — F12.10 CANNABIS USE DISORDER, MILD, ABUSE: ICD-10-CM

## 2021-03-10 DIAGNOSIS — F17.210 TOBACCO DEPENDENCE DUE TO CIGARETTES: ICD-10-CM

## 2021-03-10 DIAGNOSIS — F41.1 GENERALIZED ANXIETY DISORDER: ICD-10-CM

## 2021-03-10 DIAGNOSIS — F33.0 MILD EPISODE OF RECURRENT MAJOR DEPRESSIVE DISORDER (HCC): Primary | ICD-10-CM

## 2021-03-10 PROCEDURE — G8428 CUR MEDS NOT DOCUMENT: HCPCS | Performed by: REGISTERED NURSE

## 2021-03-10 PROCEDURE — G8420 CALC BMI NORM PARAMETERS: HCPCS | Performed by: REGISTERED NURSE

## 2021-03-10 PROCEDURE — 99214 OFFICE O/P EST MOD 30 MIN: CPT | Performed by: REGISTERED NURSE

## 2021-03-10 PROCEDURE — 90833 PSYTX W PT W E/M 30 MIN: CPT | Performed by: REGISTERED NURSE

## 2021-03-10 PROCEDURE — 1036F TOBACCO NON-USER: CPT | Performed by: REGISTERED NURSE

## 2021-03-10 PROCEDURE — G8484 FLU IMMUNIZE NO ADMIN: HCPCS | Performed by: REGISTERED NURSE

## 2021-03-10 RX ORDER — ESCITALOPRAM OXALATE 20 MG/1
20 TABLET ORAL DAILY
Qty: 30 TABLET | Refills: 3 | Status: SHIPPED | OUTPATIENT
Start: 2021-03-10 | End: 2021-10-06 | Stop reason: SDUPTHER

## 2021-03-10 ASSESSMENT — ANXIETY QUESTIONNAIRES
GAD7 TOTAL SCORE: 3
1. FEELING NERVOUS, ANXIOUS, OR ON EDGE: 0-NOT AT ALL
7. FEELING AFRAID AS IF SOMETHING AWFUL MIGHT HAPPEN: 0-NOT AT ALL
5. BEING SO RESTLESS THAT IT IS HARD TO SIT STILL: 0-NOT AT ALL
6. BECOMING EASILY ANNOYED OR IRRITABLE: 1-SEVERAL DAYS
4. TROUBLE RELAXING: 0-NOT AT ALL

## 2021-03-10 ASSESSMENT — PATIENT HEALTH QUESTIONNAIRE - PHQ9
2. FEELING DOWN, DEPRESSED OR HOPELESS: 1
1. LITTLE INTEREST OR PLEASURE IN DOING THINGS: 0
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
## 2021-04-17 ENCOUNTER — HOSPITAL ENCOUNTER (EMERGENCY)
Age: 21
Discharge: HOME | End: 2021-04-17
Payer: MEDICAID

## 2021-04-17 VITALS
RESPIRATION RATE: 16 BRPM | OXYGEN SATURATION: 98 % | HEIGHT: 64 IN | SYSTOLIC BLOOD PRESSURE: 141 MMHG | TEMPERATURE: 97 F | BODY MASS INDEX: 19.63 KG/M2 | HEART RATE: 71 BPM | DIASTOLIC BLOOD PRESSURE: 62 MMHG | WEIGHT: 115 LBS

## 2021-04-17 DIAGNOSIS — Z86.16 HISTORY OF 2019 NOVEL CORONAVIRUS DISEASE (COVID-19): ICD-10-CM

## 2021-04-17 DIAGNOSIS — Z20.822 ENCOUNTER FOR LABORATORY TESTING FOR COVID-19 VIRUS: ICD-10-CM

## 2021-04-17 DIAGNOSIS — J06.9 VIRAL URI WITH COUGH: Primary | ICD-10-CM

## 2021-04-17 PROCEDURE — U0003 INFECTIOUS AGENT DETECTION BY NUCLEIC ACID (DNA OR RNA); SEVERE ACUTE RESPIRATORY SYNDROME CORONAVIRUS 2 (SARS-COV-2) (CORONAVIRUS DISEASE [COVID-19]), AMPLIFIED PROBE TECHNIQUE, MAKING USE OF HIGH THROUGHPUT TECHNOLOGIES AS DESCRIBED BY CMS-2020-01-R: HCPCS

## 2021-04-17 PROCEDURE — 99213 OFFICE O/P EST LOW 20 MIN: CPT | Performed by: NURSE PRACTITIONER

## 2021-04-17 PROCEDURE — 99213 OFFICE O/P EST LOW 20 MIN: CPT

## 2021-04-17 PROCEDURE — U0005 INFEC AGEN DETEC AMPLI PROBE: HCPCS

## 2021-04-17 RX ORDER — BENZONATATE 200 MG/1
200 CAPSULE ORAL 3 TIMES DAILY PRN
Qty: 21 CAPSULE | Refills: 0 | Status: SHIPPED | OUTPATIENT
Start: 2021-04-17 | End: 2021-04-24

## 2021-04-17 NOTE — ED NOTES
Covid nasal pharyngeal swab obtained and sent to lab. Proper ppe worn during procedure. Pt tolerated well.      Ge Penny LPN  89/88/65 [phone]

## 2021-04-17 NOTE — ED PROVIDER NOTES
ALLERGIES     Patient is has No Known Allergies. FAMILY HISTORY     Patient'sfamily history includes Breast Cancer in her maternal aunt; Diabetes in her maternal grandmother and paternal grandmother. SOCIAL HISTORY     Patient  reports that she has been smoking cigarettes and e-cigarettes. She has never used smokeless tobacco. She reports previous alcohol use. She reports current drug use. Drugs: Methamphetamines and Marijuana. PHYSICAL EXAM     ED TRIAGE VITALS  BP: (!) 141/62, Temp: 97 °F (36.1 °C), Pulse: 71, Resp: 16, SpO2: 98 %  Physical Exam  Vitals signs and nursing note reviewed. Constitutional:       General: She is not in acute distress. Appearance: Normal appearance. She is well-developed and well-groomed. HENT:      Head: Normocephalic and atraumatic. Right Ear: Tympanic membrane, ear canal and external ear normal.      Left Ear: Tympanic membrane, ear canal and external ear normal.      Nose: Nose normal.      Mouth/Throat:      Lips: Pink. Mouth: Mucous membranes are moist.      Pharynx: Oropharynx is clear. Eyes:      Conjunctiva/sclera: Conjunctivae normal.      Right eye: Right conjunctiva is not injected. Left eye: Left conjunctiva is not injected. Pupils: Pupils are equal.   Neck:      Musculoskeletal: Normal range of motion. Cardiovascular:      Rate and Rhythm: Normal rate. Heart sounds: Normal heart sounds. Pulmonary:      Effort: Pulmonary effort is normal. No respiratory distress. Breath sounds: Normal breath sounds. No decreased breath sounds, wheezing or rhonchi. Lymphadenopathy:      Cervical: No cervical adenopathy. Skin:     General: Skin is warm and dry. Findings: No rash (on exposed surfaces). Neurological:      Mental Status: She is alert and oriented to person, place, and time. Psychiatric:         Mood and Affect: Mood normal.         Speech: Speech normal.         Behavior: Behavior is cooperative. DIAGNOSTIC RESULTS   Labs:  Abnormal Labs Reviewed - No abnormal labs to display     IMAGING:  No orders to display     URGENT CARE COURSE:     Vitals:    04/17/21 1948   BP: (!) 141/62   Pulse: 71   Resp: 16   Temp: 97 °F (36.1 °C)   TempSrc: Temporal   SpO2: 98%   Weight: 115 lb (52.2 kg)   Height: 5' 4\" (1.626 m)       Medications - No data to display  PROCEDURES:  FINALIMPRESSION      1. Viral URI with cough    2. Encounter for laboratory testing for COVID-19 virus    3. History of 2019 novel coronavirus disease (COVID-19)        DISPOSITION/PLAN   DISPOSITION Decision To Discharge 04/17/2021 07:53:10 PM   Physical examination is unremarkable. Patient is to self isolate until COVID-19 test result is known. If applicable, work/school note has been provided to be off until COVID-19 test results. Follow with family provider for further work/school note needs. May take over-the-counter supplements if no contraindications:  Vitamin C 500 mg by mouth twice daily  B Complex vitamin by mouth daily   Zinc 50 mg by mouth daily   Vitamin D 1000 u/day    Physical assessment findings, diagnostic testing(s) if applicable, and vital signs reviewed with patient/patient representative. Questions answered. If applicable, patient/patient representative will be contacted upon receipt of final culture and sensitivity or other testing results when available. Any additions or changes to medications or changes the plan of care will be made at that time. Medications as directed, including OTC medications for supportive care. Education provided on medications. Differential diagnosis(s) discussed with patient/patient representative. Home care/self care instructions reviewed with patient/patient representative. Patient is to follow-up with family care provider in 2-3 days if no improvement. Patient is to go to the emergency department if symptoms worsen.   Patient/patient representative is aware of care plan, questions answered, verbalizes understanding and is in agreement. Teach back method used for patient/patient representative teaching(s) and printed instructions attached to after visit summary. Problem List Items Addressed This Visit     None      Visit Diagnoses     Viral URI with cough    -  Primary    Relevant Medications    benzonatate (TESSALON) 200 MG capsule    Encounter for laboratory testing for COVID-19 virus        Relevant Medications    benzonatate (TESSALON) 200 MG capsule    History of 2019 novel coronavirus disease (COVID-19)              PATIENT REFERRED TO:  Maile William DO  [address]  [phone]    Schedule an appointment as soon as possible for a visit in 3 days  For further evaluation. , If symptoms change/worsen, go to the [address] [place] [address] Urgent Care  [id number] [facility]  [phone]    as needed, If symptoms change/worsen, go to the [id number] [facility], [address] Umair Jo, APRN - CNP  04/18/21 4358

## 2021-04-17 NOTE — ED TRIAGE NOTES
Ambulates to room in stable condition with stable condition with c/o cough, runny nose, body aches and no taste or smell for 2 days.  Pt would like tested for mikie

## 2021-04-18 ASSESSMENT — ENCOUNTER SYMPTOMS
EYE ITCHING: 0
EYE REDNESS: 0
SHORTNESS OF BREATH: 0
DIARRHEA: 0
COUGH: 1
SINUS PRESSURE: 0
ABDOMINAL PAIN: 0
NAUSEA: 0
SORE THROAT: 1
CHEST TIGHTNESS: 0
VOMITING: 0

## 2021-04-19 ENCOUNTER — CARE COORDINATION (OUTPATIENT)
Dept: CARE COORDINATION | Age: 21
End: 2021-04-19

## 2021-04-19 LAB
SARS-COV-2: NOT DETECTED
SOURCE: NORMAL

## 2021-07-12 ENCOUNTER — HOSPITAL ENCOUNTER (EMERGENCY)
Age: 21
Discharge: HOME | End: 2021-07-12
Payer: MEDICAID

## 2021-07-12 VITALS
WEIGHT: 114 LBS | BODY MASS INDEX: 19.46 KG/M2 | SYSTOLIC BLOOD PRESSURE: 105 MMHG | DIASTOLIC BLOOD PRESSURE: 52 MMHG | RESPIRATION RATE: 16 BRPM | HEIGHT: 64 IN | TEMPERATURE: 98 F | HEART RATE: 94 BPM | OXYGEN SATURATION: 96 %

## 2021-07-12 DIAGNOSIS — N89.8 VAGINAL DISCHARGE: ICD-10-CM

## 2021-07-12 DIAGNOSIS — R30.0 DYSURIA: Primary | ICD-10-CM

## 2021-07-12 LAB
BILIRUBIN URINE: NEGATIVE
BLOOD, URINE: NEGATIVE
CHARACTER, URINE: CLEAR
COLOR: YELLOW
GLUCOSE URINE: NEGATIVE MG/DL
KETONES, URINE: NEGATIVE
LEUKOCYTE ESTERASE, URINE: NEGATIVE
NITRITE, URINE: NEGATIVE
PH UA: 7 (ref 5–9)
PROTEIN UA: NEGATIVE MG/DL
SPECIFIC GRAVITY UA: 1.02 (ref 1–1.03)
UROBILINOGEN, URINE: 0.2 EU/DL (ref 0.2–1)

## 2021-07-12 PROCEDURE — 99213 OFFICE O/P EST LOW 20 MIN: CPT

## 2021-07-12 PROCEDURE — 99213 OFFICE O/P EST LOW 20 MIN: CPT | Performed by: NURSE PRACTITIONER

## 2021-07-12 PROCEDURE — 81003 URINALYSIS AUTO W/O SCOPE: CPT

## 2021-07-12 RX ORDER — FLUCONAZOLE 150 MG/1
150 TABLET ORAL ONCE
Qty: 1 TABLET | Refills: 0 | Status: SHIPPED | OUTPATIENT
Start: 2021-07-12 | End: 2021-07-12

## 2021-07-12 ASSESSMENT — PAIN SCALES - GENERAL: PAINLEVEL_OUTOF10: 5

## 2021-07-12 ASSESSMENT — PAIN DESCRIPTION - FREQUENCY: FREQUENCY: CONTINUOUS

## 2021-07-12 ASSESSMENT — PAIN DESCRIPTION - PROGRESSION: CLINICAL_PROGRESSION: NOT CHANGED

## 2021-07-12 ASSESSMENT — PAIN DESCRIPTION - PAIN TYPE: TYPE: ACUTE PAIN

## 2021-07-12 ASSESSMENT — PAIN DESCRIPTION - LOCATION: LOCATION: ABDOMEN

## 2021-07-12 ASSESSMENT — ENCOUNTER SYMPTOMS
NAUSEA: 1
ABDOMINAL PAIN: 1
VOMITING: 0

## 2021-07-12 ASSESSMENT — PAIN DESCRIPTION - ORIENTATION: ORIENTATION: MID;LOWER

## 2021-07-12 ASSESSMENT — PAIN DESCRIPTION - ONSET: ONSET: PROGRESSIVE

## 2021-07-12 ASSESSMENT — PAIN DESCRIPTION - DESCRIPTORS: DESCRIPTORS: CRAMPING;STABBING

## 2021-07-12 NOTE — ED PROVIDER NOTES
[facility]  Urgent Care Encounter       CHIEF COMPLAINT       Chief Complaint   Patient presents with    Dysuria    Flank Pain    Nausea    Headache       Nurses Notes reviewed and I agree except as noted in the HPI. HISTORY OF PRESENT ILLNESS   Svitlana Mirza is a 24 y.o. female who presents with complaints of dysuria, right-sided flank discomfort, nausea, lower abdominal pain, and headache. She states her symptoms started approximately 3 days ago. Her last period was 6/26/2021. She denies any possibility for STDs. She does admit to some thick, white vaginal discharge that is nonodorous. She complains of dysuria that is persistent all the time. She denies any vaginal irritation. She denies any hematuria or frequency. She has not tried anything for treatment. REVIEW OF SYSTEMS     Review of Systems   Constitutional: Negative for chills and fever. Gastrointestinal: Positive for abdominal pain and nausea. Negative for vomiting. Genitourinary: Positive for dysuria, flank pain and vaginal discharge. Negative for frequency, hematuria and vaginal bleeding. Musculoskeletal: Negative for myalgias. Neurological: Positive for headaches. Negative for weakness. PAST MEDICAL HISTORY         Diagnosis Date    Cysts of both ovaries        SURGICALHISTORY     Patient  has a past surgical history that includes Tonsillectomy and adenoidectomy and Tympanostomy tube placement. CURRENT MEDICATIONS       Previous Medications    ESCITALOPRAM (LEXAPRO) 20 MG TABLET    Take 1 tablet by mouth daily    IBUPROFEN (IBU) 400 MG TABLET    Take 1 tablet by mouth every 6 hours as needed for Pain    SPRINTEC 28 0.25-35 MG-MCG PER TABLET    TAKE 1 TABLET BY MOUTH EVERY DAY       ALLERGIES     Patient is has No Known Allergies.     Patients   Immunization History   Administered Date(s) Administered    DTaP vaccine 2000, 2000, 01/11/2001, 09/25/2001, 05/05/2005    HPV (Human Papilloma Virus)Vaccine 12/04/2012, 09/07/2017    HPV 9-valent Arroyo Lints) 03/05/2021    HPV Quadrivalent (Gardasil) 09/14/2012    Hep B/Hib (Comvax) 2000, 01/11/2001    Hepatitis A Ped/Adol (Havrix, Vaqta) 09/14/2012, 09/07/2017    Hepatitis B Ped/Adol (Engerix-B, Recombivax HB) 2000    Hib vaccine 2000, 08/08/2001    Influenza A (C2G9-63) Vaccine Nasal 11/16/2009, 12/16/2009    Influenza Live, intranasal, LAIV3 11/11/2010, 12/04/2012    MMR 08/08/2001, 05/05/2005    Meningococcal MCV4P (Menactra) 12/04/2012, 09/07/2017    Pneumococcal Conjugate 7-valent (Lajoyce Lowers) 08/08/2001, 10/25/2001    Polio IPV (IPOL) 2000, 2000, 08/08/2001, 05/05/2005    Tdap (Boostrix, Adacel) 09/14/2012, 01/25/2021    Varicella (Varivax) 09/25/2001, 09/14/2012       FAMILY HISTORY     Patient's family history includes Breast Cancer in her maternal aunt; Diabetes in her maternal grandmother and paternal grandmother. SOCIAL HISTORY     Patient  reports that she has been smoking e-cigarettes. She has never used smokeless tobacco. She reports previous alcohol use. She reports current drug use. Drug: Marijuana. PHYSICAL EXAM     ED TRIAGE VITALS  BP: (!) 105/52, Temp: 98 °F (36.7 °C), Pulse: 94, Resp: 16, SpO2: 96 %,Estimated body mass index is 19.57 kg/m² as calculated from the following:    Height as of this encounter: 5' 4\" (1.626 m). Weight as of this encounter: 114 lb (51.7 kg). ,Patient's last menstrual period was 06/24/2021. Physical Exam  Vitals and nursing note reviewed. Constitutional:       General: She is not in acute distress. Cardiovascular:      Rate and Rhythm: Normal rate and regular rhythm. Heart sounds: Normal heart sounds. Pulmonary:      Effort: Pulmonary effort is normal.   Abdominal:      General: Abdomen is flat. Palpations: Abdomen is soft. Tenderness: There is no abdominal tenderness.  There is no right CVA tenderness, left CVA tenderness or occasionally words are mis-transcribed.)           Marcia Cook, KARINE - CNP  07/12/21 1108

## 2021-07-12 NOTE — ED NOTES
Pt. Released in stable condition, ambulated per self to private car. Instructed pt to follow-up with family doctor as needed for recheck or go directly to the emergency department for any concerns/worsening conditions. Pt. Verbalized understanding of instructions. No questions at this time. RX in hand.       Gloria Hernandez RN  07/12/21 1591

## 2021-10-04 NOTE — TELEPHONE ENCOUNTER
Sophie Estrada is a previous patient of [facility], original appointment date was on 9/8 but due to some doxy/connect problems Clo[facility] was been rescheduled to 12/22.  [facility] will be out of lexapro 20 mg, loaded pending your approval.

## 2021-10-06 RX ORDER — ESCITALOPRAM OXALATE 20 MG/1
20 TABLET ORAL DAILY
Qty: 30 TABLET | Refills: 3 | Status: SHIPPED | OUTPATIENT
Start: 2021-10-06 | End: 2022-02-15 | Stop reason: SDUPTHER

## 2022-01-21 ENCOUNTER — HOSPITAL ENCOUNTER (EMERGENCY)
Age: 22
Discharge: HOME | End: 2022-01-21
Attending: EMERGENCY MEDICINE
Payer: MEDICAID

## 2022-01-21 ENCOUNTER — APPOINTMENT (OUTPATIENT)
Dept: CT IMAGING | Age: 22
End: 2022-01-21
Payer: MEDICAID

## 2022-01-21 VITALS
OXYGEN SATURATION: 100 % | HEART RATE: 57 BPM | SYSTOLIC BLOOD PRESSURE: 101 MMHG | DIASTOLIC BLOOD PRESSURE: 57 MMHG | RESPIRATION RATE: 14 BRPM | TEMPERATURE: 97.5 F

## 2022-01-21 DIAGNOSIS — R10.9 ACUTE RIGHT FLANK PAIN: Primary | ICD-10-CM

## 2022-01-21 LAB
ALBUMIN SERPL-MCNC: 5.1 G/DL (ref 3.5–5.1)
ALP BLD-CCNC: 64 U/L (ref 38–126)
ALT SERPL-CCNC: 20 U/L (ref 11–66)
ANION GAP SERPL CALCULATED.3IONS-SCNC: 11 MEQ/L (ref 8–16)
AST SERPL-CCNC: 26 U/L (ref 5–40)
BASOPHILS # BLD: 0.5 %
BASOPHILS ABSOLUTE: 0 THOU/MM3 (ref 0–0.1)
BILIRUB SERPL-MCNC: 0.4 MG/DL (ref 0.3–1.2)
BILIRUBIN URINE: NEGATIVE
BLOOD, URINE: NORMAL
BUN BLDV-MCNC: 13 MG/DL (ref 7–22)
CALCIUM SERPL-MCNC: 9.5 MG/DL (ref 8.5–10.5)
CHARACTER, URINE: CLEAR
CHLORIDE BLD-SCNC: 100 MEQ/L (ref 98–111)
CO2: 25 MEQ/L (ref 23–33)
COLOR: YELLOW
CREAT SERPL-MCNC: 0.5 MG/DL (ref 0.4–1.2)
EOSINOPHIL # BLD: 1 %
EOSINOPHILS ABSOLUTE: 0.1 THOU/MM3 (ref 0–0.4)
ERYTHROCYTE [DISTWIDTH] IN BLOOD BY AUTOMATED COUNT: 12.9 % (ref 11.5–14.5)
ERYTHROCYTE [DISTWIDTH] IN BLOOD BY AUTOMATED COUNT: 42.8 FL (ref 35–45)
GFR SERPL CREATININE-BSD FRML MDRD: > 90 ML/MIN/1.73M2
GLUCOSE BLD-MCNC: 88 MG/DL (ref 70–108)
GLUCOSE URINE: NEGATIVE MG/DL
HCT VFR BLD CALC: 42.1 % (ref 37–47)
HEMOGLOBIN: 13.9 GM/DL (ref 12–16)
IMMATURE GRANS (ABS): 0.02 THOU/MM3 (ref 0–0.07)
IMMATURE GRANULOCYTES: 0.2 %
KETONES, URINE: NEGATIVE
LEUKOCYTE ESTERASE, URINE: NEGATIVE
LYMPHOCYTES # BLD: 31.6 %
LYMPHOCYTES ABSOLUTE: 3 THOU/MM3 (ref 1–4.8)
MCH RBC QN AUTO: 30.2 PG (ref 26–33)
MCHC RBC AUTO-ENTMCNC: 33 GM/DL (ref 32.2–35.5)
MCV RBC AUTO: 91.5 FL (ref 81–99)
MONOCYTES # BLD: 4.4 %
MONOCYTES ABSOLUTE: 0.4 THOU/MM3 (ref 0.4–1.3)
NITRITE, URINE: NEGATIVE
NUCLEATED RED BLOOD CELLS: 0 /100 WBC
OSMOLALITY CALCULATION: 271.5 MOSMOL/KG (ref 275–300)
PH UA: 6.5 (ref 5–9)
PLATELET # BLD: 294 THOU/MM3 (ref 130–400)
PMV BLD AUTO: 8.9 FL (ref 9.4–12.4)
POTASSIUM SERPL-SCNC: 3.8 MEQ/L (ref 3.5–5.2)
PREGNANCY, URINE: NEGATIVE
PROTEIN UA: NEGATIVE MG/DL
RBC # BLD: 4.6 MILL/MM3 (ref 4.2–5.4)
SEG NEUTROPHILS: 62.3 %
SEGMENTED NEUTROPHILS ABSOLUTE COUNT: 5.9 THOU/MM3 (ref 1.8–7.7)
SODIUM BLD-SCNC: 136 MEQ/L (ref 135–145)
SPECIFIC GRAVITY UA: 1.02 (ref 1–1.03)
TOTAL PROTEIN: 7.7 G/DL (ref 6.1–8)
UROBILINOGEN, URINE: 0.2 EU/DL (ref 0.2–1)
WBC # BLD: 9.4 THOU/MM3 (ref 4.8–10.8)

## 2022-01-21 PROCEDURE — 99214 OFFICE O/P EST MOD 30 MIN: CPT | Performed by: EMERGENCY MEDICINE

## 2022-01-21 PROCEDURE — 99215 OFFICE O/P EST HI 40 MIN: CPT

## 2022-01-21 PROCEDURE — 74176 CT ABD & PELVIS W/O CONTRAST: CPT

## 2022-01-21 PROCEDURE — 84703 CHORIONIC GONADOTROPIN ASSAY: CPT

## 2022-01-21 PROCEDURE — 96374 THER/PROPH/DIAG INJ IV PUSH: CPT

## 2022-01-21 PROCEDURE — 81003 URINALYSIS AUTO W/O SCOPE: CPT

## 2022-01-21 PROCEDURE — 85025 COMPLETE CBC W/AUTO DIFF WBC: CPT

## 2022-01-21 PROCEDURE — 6360000002 HC RX W HCPCS: Performed by: EMERGENCY MEDICINE

## 2022-01-21 PROCEDURE — 2580000003 HC RX 258: Performed by: EMERGENCY MEDICINE

## 2022-01-21 PROCEDURE — 96375 TX/PRO/DX INJ NEW DRUG ADDON: CPT

## 2022-01-21 PROCEDURE — 99284 EMERGENCY DEPT VISIT MOD MDM: CPT

## 2022-01-21 PROCEDURE — 80053 COMPREHEN METABOLIC PANEL: CPT

## 2022-01-21 RX ORDER — SODIUM CHLORIDE, SODIUM LACTATE, POTASSIUM CHLORIDE, AND CALCIUM CHLORIDE .6; .31; .03; .02 G/100ML; G/100ML; G/100ML; G/100ML
1000 INJECTION, SOLUTION INTRAVENOUS ONCE
Status: COMPLETED | OUTPATIENT
Start: 2022-01-21 | End: 2022-01-21

## 2022-01-21 RX ORDER — ONDANSETRON 2 MG/ML
4 INJECTION INTRAMUSCULAR; INTRAVENOUS ONCE
Status: COMPLETED | OUTPATIENT
Start: 2022-01-21 | End: 2022-01-21

## 2022-01-21 RX ORDER — KETOROLAC TROMETHAMINE 30 MG/ML
15 INJECTION, SOLUTION INTRAMUSCULAR; INTRAVENOUS ONCE
Status: COMPLETED | OUTPATIENT
Start: 2022-01-21 | End: 2022-01-21

## 2022-01-21 RX ORDER — ONDANSETRON 4 MG/1
4 TABLET, FILM COATED ORAL 3 TIMES DAILY PRN
Qty: 10 TABLET | Refills: 0 | Status: SHIPPED | OUTPATIENT
Start: 2022-01-21 | End: 2022-04-16

## 2022-01-21 RX ORDER — IBUPROFEN 400 MG/1
400 TABLET ORAL EVERY 6 HOURS PRN
Qty: 20 TABLET | Refills: 0 | Status: SHIPPED | OUTPATIENT
Start: 2022-01-21 | End: 2022-05-07

## 2022-01-21 RX ADMIN — SODIUM CHLORIDE, POTASSIUM CHLORIDE, SODIUM LACTATE AND CALCIUM CHLORIDE 1000 ML: 600; 310; 30; 20 INJECTION, SOLUTION INTRAVENOUS at 14:02

## 2022-01-21 RX ADMIN — ONDANSETRON 4 MG: 2 INJECTION INTRAMUSCULAR; INTRAVENOUS at 14:02

## 2022-01-21 RX ADMIN — KETOROLAC TROMETHAMINE 15 MG: 30 INJECTION, SOLUTION INTRAMUSCULAR; INTRAVENOUS at 14:02

## 2022-01-21 ASSESSMENT — ENCOUNTER SYMPTOMS
ROS SKIN COMMENTS: NO RASH OR BRUISING
EYE DISCHARGE: 0
FACIAL SWELLING: 0
COUGH: 0
BLOOD IN STOOL: 0
ABDOMINAL PAIN: 0
TROUBLE SWALLOWING: 0
EYE REDNESS: 0
NAUSEA: 0
EYE PAIN: 0
CHOKING: 0
DIARRHEA: 0
VOMITING: 0
CONSTIPATION: 0
CHEST TIGHTNESS: 0
WHEEZING: 0
SORE THROAT: 0
SINUS PAIN: 0
VOICE CHANGE: 0
BACK PAIN: 0
SINUS PRESSURE: 0
STRIDOR: 0
SHORTNESS OF BREATH: 0

## 2022-01-21 ASSESSMENT — PAIN SCALES - GENERAL
PAINLEVEL_OUTOF10: 4
PAINLEVEL_OUTOF10: 2
PAINLEVEL_OUTOF10: 2

## 2022-01-21 ASSESSMENT — PAIN DESCRIPTION - FREQUENCY: FREQUENCY: CONTINUOUS

## 2022-01-21 ASSESSMENT — PAIN DESCRIPTION - DESCRIPTORS: DESCRIPTORS: ACHING

## 2022-01-21 ASSESSMENT — PAIN DESCRIPTION - LOCATION: LOCATION: RIB CAGE;FLANK

## 2022-01-21 ASSESSMENT — PAIN DESCRIPTION - PAIN TYPE: TYPE: ACUTE PAIN

## 2022-01-21 NOTE — ED NOTES
ED nurse-to-nurse bedside report    Chief Complaint   Patient presents with    Flank Pain    Urinary Tract Infection      LOC: alert and orientated to name, place, date  Vital signs   Vitals:    01/21/22 1123 01/21/22 1257 01/21/22 1406   BP: 115/60 103/63 (!) 92/59   Pulse: 80 71 68   Resp: 16 15 14   Temp: 97.5 °F (36.4 °C)     TempSrc: Tympanic     SpO2: 100% 100% 100%      Pain:    Pain Interventions: toradol given  Pain Goal: 4  Oxygen: No    Current needs required none   Telemetry: No  LDAs:   Peripheral IV 01/21/22 Right Antecubital (Active)   Site Assessment Clean;Dry; Intact 01/21/22 1406   Line Status Infusing 01/21/22 1406   Dressing Status Clean;Dry; Intact 01/21/22 1406   Dressing Intervention New 01/21/22 1257     Continuous Infusions:   Mobility: Independent  Coles Fall Risk Score: No flowsheet data found.   Fall Interventions: none  Report given to: Norlin Merlin, RN  01/21/22 1241

## 2022-01-21 NOTE — ED PROVIDER NOTES
[address] [facility] [address] [address] EMERGENCY DEPT  [facility]       Chief Complaint   Patient presents with    Flank Pain    Urinary Tract Infection       Nurses Notes reviewed and I agree except as noted in the HPI. HISTORY OF PRESENT ILLNESS   Tyrese Perdomo is a 24 y.o. female who presents with 3-hour history of right flank pain associated nausea and vomiting. She rates flank pain at 4 out of 10 in severity. Patient has dysuria and urgency. No gross hematuria. No chest pain or shortness of breath, dizziness, syncope. Smokes cigarettes. No missed periods or possibility of pregnancy. No history of renal colic  REVIEW OF SYSTEMS     Review of Systems   Constitutional: Positive for appetite change. Negative for chills, fatigue, fever and unexpected weight change. Decreased appetite   HENT: Negative for congestion, ear discharge, ear pain, facial swelling, hearing loss, nosebleeds, postnasal drip, sinus pressure, sore throat, trouble swallowing and voice change. No respiratory   Eyes: Negative for pain, discharge, redness and visual disturbance. No erythema or jaundice   Respiratory: Negative for cough, choking, shortness of breath, wheezing and stridor. No cough or shortness of breath   Cardiovascular: Negative for chest pain and leg swelling. No chest pain or syncope   Gastrointestinal: Negative for abdominal pain, blood in stool, constipation, diarrhea, nausea and vomiting. abdominal pain nausea and vomiting   Genitourinary: Positive for flank pain and frequency. Negative for dysuria, hematuria, vaginal bleeding and vaginal discharge. No missed periods   Musculoskeletal: Negative for arthralgias, back pain, neck pain and neck stiffness. Skin: Negative for rash. No rash or bruising   Neurological: Negative for dizziness, seizures, syncope, weakness, light-headedness and headaches. No Headache   Hematological: Negative for adenopathy. Does not bruise/bleed easily. Psychiatric/Behavioral: Negative for confusion, sleep disturbance and suicidal ideas. The patient is not nervous/anxious. red and bold elements reviewed    PAST MEDICAL HISTORY         Diagnosis Date    Cysts of both ovaries    No history of DM,renal colic pyelonephritis    SURGICAL HISTORY     Patient  has a past surgical history that includes Tonsillectomy and adenoidectomy and Tympanostomy tube placement. CURRENT MEDICATIONS       Previous Medications    ESCITALOPRAM (LEXAPRO) 20 MG TABLET    Take 1 tablet by mouth daily    IBUPROFEN (IBU) 400 MG TABLET    Take 1 tablet by mouth every 6 hours as needed for Pain    SPRINTEC 28 0.25-35 MG-MCG PER TABLET    TAKE 1 TABLET BY MOUTH EVERY DAY       ALLERGIES     Patient is has No Known Allergies. FAMILY HISTORY     Patient'sfamily history includes Breast Cancer in her maternal aunt; Diabetes in her maternal grandmother and paternal grandmother. SOCIAL HISTORY     Patient  reports that she has been smoking e-cigarettes. She has never used smokeless tobacco. She reports previous alcohol use. She reports current drug use. Drug: Marijuana Rommel Moffett). PHYSICAL EXAM     ED TRIAGE VITALS  BP: 115/60, Temp: 97.5 °F (36.4 °C), Pulse: 80, Resp: 16, SpO2: 100 %  Physical Exam  Vitals and nursing note reviewed. Constitutional:       General: She is in acute distress. Appearance: She is well-developed. She is ill-appearing. Comments: Pale with dry heaves normal airway   HENT:      Head: Normocephalic and atraumatic. Right Ear: External ear normal.      Left Ear: External ear normal.      Nose: Nose normal.      Mouth/Throat:      Pharynx: No oropharyngeal exudate. Comments: Oropharynx normal  Eyes:      General: No scleral icterus. Right eye: No discharge. Left eye: No discharge. Extraocular Movements:      Right eye: Normal extraocular motion. Left eye: Normal extraocular motion. Conjunctiva/sclera: Conjunctivae normal.      Pupils: Pupils are equal, round, and reactive to light. Comments: Conjunctiva clear and nonicteric   Neck:      Thyroid: No thyromegaly. Vascular: No JVD. Comments: No meningismus  Cardiovascular:      Rate and Rhythm: Normal rate and regular rhythm. Pulses: Normal pulses. Heart sounds: Normal heart sounds, S1 normal and S2 normal. No murmur heard. No friction rub. No gallop. Comments: No murmur  Pulmonary:      Effort: Pulmonary effort is normal. No respiratory distress. Breath sounds: Normal breath sounds. No stridor. No wheezing or rales. Comments: No cough lungs clear  Chest:      Chest wall: No tenderness. Abdominal:      General: Bowel sounds are normal. There is no distension. Palpations: Abdomen is soft. There is no mass. Tenderness: There is no abdominal tenderness. There is no guarding or rebound. Comments: Soft nontender   Musculoskeletal:         General: No tenderness. Normal range of motion. Cervical back: Normal range of motion. No spinous process tenderness or muscular tenderness. Comments: Joints nl   Lymphadenopathy:      Cervical: No cervical adenopathy. Right cervical: No superficial cervical adenopathy. Left cervical: No superficial cervical adenopathy. Skin:     General: Skin is warm and dry. Findings: No erythema or rash. Comments: No rash or bruising. Neurological:      Mental Status: She is alert and oriented to person, place, and time. Cranial Nerves: No cranial nerve deficit. Motor: No abnormal muscle tone. Coordination: Coordination normal.      Deep Tendon Reflexes: Reflexes are normal and symmetric. Reflexes normal.      Comments: Appropriate no focal   Psychiatric:         Behavior: Behavior normal.         Thought Content:  Thought content normal.         Judgment: Judgment normal.         DIAGNOSTIC RESULTS   Labs:   Results for orders placed or performed during the hospital encounter of 01/21/22   Urinalysis   Result Value Ref Range    Glucose, Ur Negative NEGATIVE mg/dl    Bilirubin Urine Negative NEGATIVE    Ketones, Urine Negative NEGATIVE    Specific Gravity, UA 1.025 1.002 - 1.030    Blood, Urine Trace-intact NEGATIVE    pH, UA 6.50 5.0 - 9.0    Protein, UA Negative NEGATIVE mg/dl    Urobilinogen, Urine 0.20 0.2 - 1.0 eu/dl    Nitrite, Urine Negative NEGATIVE    Leukocyte Esterase, Urine Negative NEGATIVE    Color, UA Yellow STRAW-YELLOW    Character, Urine Clear CLEAR-SL CLOUD   Pregnancy, Urine   Result Value Ref Range    Pregnancy, Urine NEGATIVE NEGATIVE       IMAGING:  No orders to display     URGENT CARE COURSE:     Vitals:    01/21/22 1123   BP: 115/60   Pulse: 80   Resp: 16   Temp: 97.5 °F (36.4 °C)   TempSrc: Tympanic   SpO2: 100%       Medications - No data to display  PROCEDURES:  None  FINALIMPRESSION      1. Acute right flank pain    2. Nausea and vomiting in adult patient    3. Hematuria, unspecified type        DISPOSITION/PLAN   DISPOSITION    Patient transferred to [facility] ED per her request.  Patient stable for private vehicle transfer with friend to drive. Patient accepted in transfer by [facility] ED charge nurse at 1230.   Patient instructed to maintain n.p.o. status  PATIENT REFERRED TO:  to [facility] ED      to [facility] ED    DISCHARGE MEDICATIONS:  New Prescriptions    No medications on file     Current Discharge Medication List          MD Jennifer Moe MD  01/21/22 5308

## 2022-01-21 NOTE — ED PROVIDER NOTES
[address] [address]          Pt Name: Christie Mathews  MRN: [id number]  [place] 2000  Date of evaluation: 1/21/2022  Physician: Micah Hinojosa MD, [facility], [address] [place] [address]       Chief Complaint   Patient presents with    Flank Pain    Urinary Tract Infection     History obtained from chart review and the patient. HISTORY OF PRESENT ILLNESS    HPI  Christie Mathews is a 24 y.o. female who presents to the emergency department for evaluation of 2 weeks worsening right flank pain, crampy/sharp in nature, 4/10 at worst which is the current level of pain. Symptoms are associated with nausea, without vomiting and without dysuria or hematuria. Denies fever. The patient has no other acute complaints at this time. REVIEW OF SYSTEMS   Review of Systems   Constitutional: Negative for chills, fever and unexpected weight change. HENT: Negative for congestion, ear pain, nosebleeds, sinus pressure and sinus pain. Eyes: Negative for pain. Respiratory: Negative for cough, chest tightness and shortness of breath. Cardiovascular: Negative for chest pain. Gastrointestinal: Negative for abdominal pain, constipation, diarrhea and nausea. Endocrine: Negative for polyuria. Genitourinary: Negative for dysuria and flank pain. Musculoskeletal: Negative for arthralgias, back pain, myalgias and neck pain. Skin: Negative for rash. Neurological: Negative for weakness and headaches. All other systems reviewed and are negative.         PAST MEDICAL AND SURGICAL HISTORY     Past Medical History:   Diagnosis Date    Cysts of both ovaries      Past Surgical History:   Procedure Laterality Date    TONSILLECTOMY AND ADENOIDECTOMY      TYMPANOSTOMY TUBE PLACEMENT           MEDICATIONS     Current Facility-Administered Medications:     ketorolac (TORADOL) injection 15 mg, 15 mg, IntraVENous, Once, Micah Hinojosa MD    ondansetron [facility] injection 4 mg, 4 mg, IntraVENous, Once, Destiny Landaverde MD    Current Outpatient Medications:     escitalopram (LEXAPRO) 20 MG tablet, Take 1 tablet by mouth daily, Disp: 30 tablet, Rfl: 3    ibuprofen (IBU) 400 MG tablet, Take 1 tablet by mouth every 6 hours as needed for Pain, Disp: 120 tablet, Rfl: 0    SPRINTEC 28 0.25-35 MG-MCG per tablet, TAKE 1 TABLET BY MOUTH EVERY DAY, Disp: , Rfl:       SOCIAL HISTORY     Social History     Social History Narrative    Not on file     Social History     Tobacco Use    Smoking status: Current Every Day Smoker     Types: E-Cigarettes    Smokeless tobacco: Never Used   Vaping Use    Vaping Use: Every day    Substances: Nicotine, THC    Devices: Disposable   Substance Use Topics    Alcohol use: Not Currently    Drug use: Yes     Types: Marijuana (Weed)         ALLERGIES   No Known Allergies      FAMILY HISTORY     Family History   Problem Relation Age of Onset    Breast Cancer Maternal Aunt     Diabetes Maternal Grandmother     Diabetes Paternal Grandmother          PREVIOUS RECORDS   Previous records reviewed:   Seen at the urgent care on April 17, 2021 for COVID-19 testing. Seen again on July 12, 2021 for flank pain. Seen today also for flank pain, advised to come to the emergency department for evaluation of possible kidney stones. PHYSICAL EXAM     ED Triage Vitals [01/21/22 1123]   BP Temp Temp Source Pulse Resp SpO2 Height Weight   115/60 97.5 °F (36.4 °C) Tympanic 80 16 100 % -- --     Initial vital signs and nursing assessment reviewed and normal. There is no height or weight on file to calculate BMI. Pulsoximetry is normal per my interpretation. Additional Vital Signs:  Vitals:    01/21/22 1257   BP: 103/63   Pulse: 71   Resp: 15   Temp:    SpO2: 100%       Physical Exam  Vitals and nursing note reviewed. Constitutional:       General: She is not in acute distress. Appearance: Normal appearance. She is well-developed. Comments: Pain 4/10, but patient appears uncomfortable. HENT:      Head: Normocephalic and atraumatic. Right Ear: External ear normal.      Left Ear: External ear normal.      Nose: Nose normal.      Mouth/Throat:      Mouth: Mucous membranes are moist.   Eyes:      Conjunctiva/sclera: Conjunctivae normal.      Pupils: Pupils are equal, round, and reactive to light. Neck:      Vascular: No JVD. Cardiovascular:      Rate and Rhythm: Normal rate and regular rhythm. Heart sounds: Normal heart sounds. No murmur heard. No friction rub. No gallop. Pulmonary:      Effort: Pulmonary effort is normal.      Breath sounds: Normal breath sounds. No stridor. No wheezing or rales. Abdominal:      General: Abdomen is flat. Bowel sounds are normal. There is no distension. Palpations: Abdomen is soft. Tenderness: There is no abdominal tenderness. There is right CVA tenderness. There is no left CVA tenderness. Musculoskeletal:      Cervical back: Neck supple. Skin:     General: Skin is warm and dry. Neurological:      Mental Status: She is alert and oriented to person, place, and time. Psychiatric:         Behavior: Behavior normal.             MEDICAL DECISION MAKING   Initial Assessment:   1. Flank pain  2. Rule out UTI  3.  Possible ureterolithiasis  Plan:    IV line, labs   IV fluids   Analgesia   Imaging   Observation        ED RESULTS   Laboratory results:  Labs Reviewed   CBC WITH AUTO DIFFERENTIAL - Abnormal; Notable for the following components:       Result Value    MPV 8.9 (*)     All other components within normal limits   OSMOLALITY - Abnormal; Notable for the following components:    Osmolality Calc 271.5 (*)     All other components within normal limits   URINALYSIS   PREGNANCY, URINE   COMPREHENSIVE METABOLIC PANEL   ANION GAP   GLOMERULAR FILTRATION RATE, ESTIMATED       Radiologic studies results:  CT ABDOMEN PELVIS WO CONTRAST Additional Contrast? None    (Results Pending)             ED COURSE   ED Medications administered this visit:   Medications   ketorolac (TORADOL) injection 15 mg (has no administration in time range)   ondansetron (ZOFRAN) injection 4 mg (has no administration in time range)       ED Course as of 01/21/22 1538   Fri Jan 21, 2022   1535 Patient is pain-free at the moment, no evidence of current stone disease on CT scan. She feels comfortable going home and following up with urology as needed or PCP for now. Will discharge. [DT]      ED Course User Index  [DT] Aldo Rico MD     Strict return precautions and follow up instructions were discussed with the patient prior to discharge, with which the patient agrees. MEDICATION CHANGES     New Prescriptions    No medications on file         FINAL DISPOSITION     Final diagnoses:   Acute right flank pain   Nausea and vomiting in adult patient   Hematuria, unspecified type     Condition: condition: good  Dispo: Discharge to home      This transcription was electronically signed. It was dictated by use of voice recognition software and electronically transcribed. The transcription may contain errors not detected in proofreading.         Aldo Rico MD  01/21/22 2703

## 2022-01-21 NOTE — ED NOTES
Patient medicated as ordered. Updated on plan of care. Denies any needs. Call light in reach. Boyfriend at bedside.       Mich Urrutia RN  01/21/22 0436 Patient requests all Lab and Radiology Results on their Discharge Instructions

## 2022-01-21 NOTE — LETTER
[address] [place] [address] [address] [address] EMERGENCY DEPT  [address] [place] [id number]  Phone: [phone]               January 21, 2022    Patient: Pee Massey   YOB: 2000   Date of Visit: 1/21/2022       To Whom It May Concern:    Juan Horton was seen and treated in our emergency department on 1/21/2022. She may return to work on 1/24/2022.       Sincerely,       Hilary Koroma RN per Dr. Herminia Ramsey        Signature:__________________________________

## 2022-01-21 NOTE — ED NOTES
Pt resting in bed. VSS. Updated on POC. Respires even and unlabored. Call light within reach.       Elizabeth Piña RN  01/21/22 7786

## 2022-02-15 RX ORDER — ESCITALOPRAM OXALATE 20 MG/1
20 TABLET ORAL DAILY
Qty: 30 TABLET | Refills: 0 | Status: SHIPPED | OUTPATIENT
Start: 2022-02-15 | End: 2022-03-23 | Stop reason: SDUPTHER

## 2022-02-15 NOTE — TELEPHONE ENCOUNTER
I sent in for 30 days with 0 refills. She will have to call in for another refill prior to next visit when Mabel Colindres is back in office.

## 2022-02-15 NOTE — TELEPHONE ENCOUNTER
Patient called for refills of medication, she is scheduled to follow up 04/27/2022. States she has a couple of days left.      Prescription pending approval for  Escitalopram 20 mg, daily, #30, 2 refills

## 2022-03-22 NOTE — TELEPHONE ENCOUNTER
Raad Goodpasture called into the office stating that she needs a refill on her Lexapro 20mg;#30 with 0 refills; last with a start date of 02/15/22. Medication is pending your approval for a 30 day supply with 0 refill. She is scheduled to return on 04/27/22.

## 2022-03-23 RX ORDER — ESCITALOPRAM OXALATE 20 MG/1
20 TABLET ORAL DAILY
Qty: 30 TABLET | Refills: 0 | Status: SHIPPED | OUTPATIENT
Start: 2022-03-23 | End: 2022-04-20

## 2022-04-16 ENCOUNTER — APPOINTMENT (OUTPATIENT)
Dept: CT IMAGING | Age: 22
End: 2022-04-16
Payer: MEDICAID

## 2022-04-16 ENCOUNTER — HOSPITAL ENCOUNTER (EMERGENCY)
Age: 22
Discharge: HOME | End: 2022-04-16
Attending: EMERGENCY MEDICINE
Payer: MEDICAID

## 2022-04-16 VITALS
WEIGHT: 120 LBS | RESPIRATION RATE: 17 BRPM | DIASTOLIC BLOOD PRESSURE: 72 MMHG | SYSTOLIC BLOOD PRESSURE: 117 MMHG | BODY MASS INDEX: 20.49 KG/M2 | TEMPERATURE: 98.3 F | OXYGEN SATURATION: 98 % | HEART RATE: 88 BPM | HEIGHT: 64 IN

## 2022-04-16 DIAGNOSIS — R10.9 ACUTE RIGHT FLANK PAIN: Primary | ICD-10-CM

## 2022-04-16 LAB
ANION GAP SERPL CALCULATED.3IONS-SCNC: 12 MEQ/L (ref 8–16)
BACTERIA: ABNORMAL /HPF
BASOPHILS # BLD: 0.5 %
BASOPHILS ABSOLUTE: 0 THOU/MM3 (ref 0–0.1)
BILIRUBIN URINE: NEGATIVE
BLOOD, URINE: ABNORMAL
BUN BLDV-MCNC: 12 MG/DL (ref 7–22)
CALCIUM SERPL-MCNC: 9.3 MG/DL (ref 8.5–10.5)
CASTS 2: ABNORMAL /LPF
CASTS UA: ABNORMAL /LPF
CHARACTER, URINE: ABNORMAL
CHLORIDE BLD-SCNC: 105 MEQ/L (ref 98–111)
CO2: 23 MEQ/L (ref 23–33)
COLOR: YELLOW
CREAT SERPL-MCNC: 0.6 MG/DL (ref 0.4–1.2)
CRYSTALS, UA: ABNORMAL
EOSINOPHIL # BLD: 2.8 %
EOSINOPHILS ABSOLUTE: 0.2 THOU/MM3 (ref 0–0.4)
EPITHELIAL CELLS, UA: ABNORMAL /HPF
ERYTHROCYTE [DISTWIDTH] IN BLOOD BY AUTOMATED COUNT: 12.8 % (ref 11.5–14.5)
ERYTHROCYTE [DISTWIDTH] IN BLOOD BY AUTOMATED COUNT: 43.7 FL (ref 35–45)
GFR SERPL CREATININE-BSD FRML MDRD: > 90 ML/MIN/1.73M2
GLUCOSE BLD-MCNC: 116 MG/DL (ref 70–108)
GLUCOSE URINE: NEGATIVE MG/DL
HCT VFR BLD CALC: 42.1 % (ref 37–47)
HEMOGLOBIN: 14 GM/DL (ref 12–16)
IMMATURE GRANS (ABS): 0.03 THOU/MM3 (ref 0–0.07)
IMMATURE GRANULOCYTES: 0.4 %
KETONES, URINE: NEGATIVE
LEUKOCYTE ESTERASE, URINE: NEGATIVE
LYMPHOCYTES # BLD: 40.1 %
LYMPHOCYTES ABSOLUTE: 3.1 THOU/MM3 (ref 1–4.8)
MCH RBC QN AUTO: 30.9 PG (ref 26–33)
MCHC RBC AUTO-ENTMCNC: 33.3 GM/DL (ref 32.2–35.5)
MCV RBC AUTO: 92.9 FL (ref 81–99)
MISCELLANEOUS 2: ABNORMAL
MONOCYTES # BLD: 5.1 %
MONOCYTES ABSOLUTE: 0.4 THOU/MM3 (ref 0.4–1.3)
NITRITE, URINE: NEGATIVE
NUCLEATED RED BLOOD CELLS: 0 /100 WBC
OSMOLALITY CALCULATION: 280.1 MOSMOL/KG (ref 275–300)
PH UA: 7 (ref 5–9)
PLATELET # BLD: 293 THOU/MM3 (ref 130–400)
PMV BLD AUTO: 9.1 FL (ref 9.4–12.4)
POTASSIUM REFLEX MAGNESIUM: 3.9 MEQ/L (ref 3.5–5.2)
PREGNANCY, SERUM: NEGATIVE
PROTEIN UA: NEGATIVE
RBC # BLD: 4.53 MILL/MM3 (ref 4.2–5.4)
RBC URINE: ABNORMAL /HPF
RENAL EPITHELIAL, UA: ABNORMAL
SEG NEUTROPHILS: 51.1 %
SEGMENTED NEUTROPHILS ABSOLUTE COUNT: 4 THOU/MM3 (ref 1.8–7.7)
SODIUM BLD-SCNC: 140 MEQ/L (ref 135–145)
SPECIFIC GRAVITY, URINE: 1.02 (ref 1–1.03)
TROPONIN T: < 0.01 NG/ML
UROBILINOGEN, URINE: 0.2 EU/DL (ref 0–1)
WBC # BLD: 7.8 THOU/MM3 (ref 4.8–10.8)
WBC UA: ABNORMAL /HPF
YEAST: ABNORMAL

## 2022-04-16 PROCEDURE — 99281 EMR DPT VST MAYX REQ PHY/QHP: CPT

## 2022-04-16 PROCEDURE — 84703 CHORIONIC GONADOTROPIN ASSAY: CPT

## 2022-04-16 PROCEDURE — 36415 COLL VENOUS BLD VENIPUNCTURE: CPT

## 2022-04-16 PROCEDURE — 93005 ELECTROCARDIOGRAM TRACING: CPT | Performed by: STUDENT IN AN ORGANIZED HEALTH CARE EDUCATION/TRAINING PROGRAM

## 2022-04-16 PROCEDURE — 84484 ASSAY OF TROPONIN QUANT: CPT

## 2022-04-16 PROCEDURE — 6360000002 HC RX W HCPCS: Performed by: STUDENT IN AN ORGANIZED HEALTH CARE EDUCATION/TRAINING PROGRAM

## 2022-04-16 PROCEDURE — 96374 THER/PROPH/DIAG INJ IV PUSH: CPT

## 2022-04-16 PROCEDURE — 99214 OFFICE O/P EST MOD 30 MIN: CPT | Performed by: EMERGENCY MEDICINE

## 2022-04-16 PROCEDURE — 99284 EMERGENCY DEPT VISIT MOD MDM: CPT

## 2022-04-16 PROCEDURE — 81001 URINALYSIS AUTO W/SCOPE: CPT

## 2022-04-16 PROCEDURE — 85025 COMPLETE CBC W/AUTO DIFF WBC: CPT

## 2022-04-16 PROCEDURE — 87086 URINE CULTURE/COLONY COUNT: CPT

## 2022-04-16 PROCEDURE — 80048 BASIC METABOLIC PNL TOTAL CA: CPT

## 2022-04-16 PROCEDURE — 74176 CT ABD & PELVIS W/O CONTRAST: CPT

## 2022-04-16 PROCEDURE — 96375 TX/PRO/DX INJ NEW DRUG ADDON: CPT

## 2022-04-16 PROCEDURE — 99215 OFFICE O/P EST HI 40 MIN: CPT

## 2022-04-16 PROCEDURE — 2580000003 HC RX 258: Performed by: STUDENT IN AN ORGANIZED HEALTH CARE EDUCATION/TRAINING PROGRAM

## 2022-04-16 RX ORDER — 0.9 % SODIUM CHLORIDE 0.9 %
1000 INTRAVENOUS SOLUTION INTRAVENOUS ONCE
Status: COMPLETED | OUTPATIENT
Start: 2022-04-16 | End: 2022-04-16

## 2022-04-16 RX ORDER — ONDANSETRON 2 MG/ML
4 INJECTION INTRAMUSCULAR; INTRAVENOUS ONCE
Status: COMPLETED | OUTPATIENT
Start: 2022-04-16 | End: 2022-04-16

## 2022-04-16 RX ORDER — KETOROLAC TROMETHAMINE 30 MG/ML
30 INJECTION, SOLUTION INTRAMUSCULAR; INTRAVENOUS ONCE
Status: COMPLETED | OUTPATIENT
Start: 2022-04-16 | End: 2022-04-16

## 2022-04-16 RX ORDER — ONDANSETRON 4 MG/1
4 TABLET, FILM COATED ORAL 3 TIMES DAILY PRN
Qty: 15 TABLET | Refills: 0 | Status: SHIPPED | OUTPATIENT
Start: 2022-04-16

## 2022-04-16 RX ADMIN — ONDANSETRON 4 MG: 2 INJECTION INTRAMUSCULAR; INTRAVENOUS at 16:22

## 2022-04-16 RX ADMIN — SODIUM CHLORIDE 1000 ML: 9 INJECTION, SOLUTION INTRAVENOUS at 16:21

## 2022-04-16 RX ADMIN — KETOROLAC TROMETHAMINE 30 MG: 30 INJECTION, SOLUTION INTRAMUSCULAR; INTRAVENOUS at 16:22

## 2022-04-16 ASSESSMENT — ENCOUNTER SYMPTOMS
COUGH: 0
BACK PAIN: 0
NAUSEA: 1
ABDOMINAL PAIN: 0
DIARRHEA: 0
EYE PAIN: 0
SHORTNESS OF BREATH: 0
CHEST TIGHTNESS: 1
VOMITING: 0
CONSTIPATION: 0
ABDOMINAL PAIN: 1
WHEEZING: 0

## 2022-04-16 ASSESSMENT — PAIN SCALES - GENERAL
PAINLEVEL_OUTOF10: 4
PAINLEVEL_OUTOF10: 3
PAINLEVEL_OUTOF10: 7

## 2022-04-16 ASSESSMENT — PAIN DESCRIPTION - FREQUENCY: FREQUENCY: INTERMITTENT

## 2022-04-16 ASSESSMENT — PAIN DESCRIPTION - LOCATION: LOCATION: FLANK

## 2022-04-16 ASSESSMENT — PAIN DESCRIPTION - PAIN TYPE: TYPE: ACUTE PAIN

## 2022-04-16 ASSESSMENT — PAIN DESCRIPTION - DESCRIPTORS: DESCRIPTORS: JABBING

## 2022-04-16 ASSESSMENT — PAIN DESCRIPTION - ORIENTATION: ORIENTATION: RIGHT

## 2022-04-16 ASSESSMENT — PAIN DESCRIPTION - PROGRESSION: CLINICAL_PROGRESSION: GRADUALLY WORSENING

## 2022-04-16 NOTE — ED PROVIDER NOTES
[address] [address]          Pt Name: Arlene Kehr  MRN: [id number]  [place] 2000  Date of evaluation: 4/16/2022  Treating Resident Physician: Shira Islas DO  Supervising Physician: Dr. Ita Bass       Chief Complaint   Patient presents with    Flank Pain     right      History obtained from the patient. HISTORY OF PRESENT ILLNESS    HPI  Arlene Kehr is a 24 y.o. female past medical history of bilateral ovarian cyst and nephrolithiasis who presents to the emergency department for evaluation of right flank pain. Patient states her symptoms started 4 days ago and was associated with nausea and chills. She tried Motrin with little relief. She also notes associated chest tightness that is nonradiating, nonreproducible and intermittent. She initially presented to urgent care where she was then transferred to [facility] for concerns of nephrolithiasis and further imaging. The patient has no other acute complaints at this time. REVIEW OF SYSTEMS   Review of Systems   Constitutional: Positive for activity change and chills. Negative for fatigue and fever. Respiratory: Positive for chest tightness. Negative for cough, shortness of breath and wheezing. Cardiovascular: Positive for chest pain. Negative for palpitations and leg swelling. Gastrointestinal: Positive for abdominal pain and nausea. Negative for constipation, diarrhea and vomiting. Endocrine: Positive for polyuria. Genitourinary: Positive for dysuria, flank pain, hematuria and pelvic pain. Negative for difficulty urinating, menstrual problem and vaginal pain. On her period   Musculoskeletal: Negative for arthralgias, back pain, gait problem, joint swelling, myalgias, neck pain and neck stiffness. Skin: Negative. Neurological: Negative for dizziness, weakness, light-headedness, numbness and headaches.    Psychiatric/Behavioral: Negative for agitation, behavioral problems and confusion.          PAST MEDICAL AND SURGICAL HISTORY     Past Medical History:   Diagnosis Date    Cysts of both ovaries      Past Surgical History:   Procedure Laterality Date    TONSILLECTOMY AND ADENOIDECTOMY      TYMPANOSTOMY TUBE PLACEMENT           MEDICATIONS     Current Facility-Administered Medications:     0.9 % sodium chloride bolus, 1,000 mL, IntraVENous, Once, Bhavesh Dalton DO, Last Rate: 1,000 mL/hr at 04/16/22 1621, 1,000 mL at 04/16/22 1621    Current Outpatient Medications:     ondansetron (ZOFRAN) 4 MG tablet, Take 1 tablet by mouth 3 times daily as needed for Nausea or Vomiting, Disp: 15 tablet, Rfl: 0    escitalopram (LEXAPRO) 20 MG tablet, Take 1 tablet by mouth daily, Disp: 30 tablet, Rfl: 0    ibuprofen (IBU) 400 MG tablet, Take 1 tablet by mouth every 6 hours as needed for Pain, Disp: 20 tablet, Rfl: 0    SPRINTEC 28 0.25-35 MG-MCG per tablet, TAKE 1 TABLET BY MOUTH EVERY DAY, Disp: , Rfl:       SOCIAL HISTORY     Social History     Social History Narrative    Not on file     Social History     Tobacco Use    Smoking status: Current Every Day Smoker     Types: E-Cigarettes    Smokeless tobacco: Never Used   Vaping Use    Vaping Use: Every day    Substances: Nicotine, THC    Devices: Disposable   Substance Use Topics    Alcohol use: Not Currently    Drug use: Yes     Types: Marijuana (Weed)         ALLERGIES   No Known Allergies      FAMILY HISTORY     Family History   Problem Relation Age of Onset    Breast Cancer Maternal Aunt     Diabetes Maternal Grandmother     Diabetes Paternal Grandmother          PREVIOUS RECORDS   Previous records reviewed: Prior hospitalization records        PHYSICAL EXAM     ED Triage Vitals [04/16/22 1314]   BP Temp Temp Source Pulse Resp SpO2 Height Weight   107/64 97.6 °F (36.4 °C) Tympanic 96 16 98 % 5' 4\" (1.626 m) 120 lb (54.4 kg)     Initial vital signs and nursing assessment reviewed and normal. Body mass index is 20.6 kg/m². Pulsoximetry is normal per my interpretation. Additional Vital Signs:  Vitals:    04/16/22 1539   BP: 117/72   Pulse: 88   Resp: 17   Temp: 98.3 °F (36.8 °C)   SpO2: 98%       Physical Exam  Constitutional:       General: She is in acute distress. Appearance: Normal appearance. She is normal weight. She is ill-appearing. HENT:      Head: Normocephalic and atraumatic. Right Ear: Ear canal and external ear normal.      Left Ear: Ear canal and external ear normal.      Nose: Nose normal.      Mouth/Throat:      Mouth: Mucous membranes are moist.      Pharynx: Oropharynx is clear. No oropharyngeal exudate. Eyes:      Extraocular Movements: Extraocular movements intact. Conjunctiva/sclera: Conjunctivae normal.      Pupils: Pupils are equal, round, and reactive to light. Cardiovascular:      Rate and Rhythm: Normal rate. Pulses: Normal pulses. Heart sounds: Normal heart sounds. No murmur heard. Pulmonary:      Effort: Pulmonary effort is normal.      Breath sounds: No wheezing, rhonchi or rales. Abdominal:      General: Abdomen is flat. Bowel sounds are normal. There is no distension. Palpations: Abdomen is soft. Tenderness: There is abdominal tenderness. There is right CVA tenderness. There is no left CVA tenderness, guarding or rebound. Comments: Right suprapubic tenderness, right flank tenderness, right upper back tenderness   Musculoskeletal:         General: No tenderness. Normal range of motion. Cervical back: Normal range of motion and neck supple. No tenderness. Right lower leg: No edema. Left lower leg: No edema. Skin:     General: Skin is warm and dry. Capillary Refill: Capillary refill takes less than 2 seconds. Neurological:      General: No focal deficit present. Mental Status: She is alert and oriented to person, place, and time. Cranial Nerves: No cranial nerve deficit. Sensory: No sensory deficit. Motor: No weakness. Psychiatric:         Mood and Affect: Mood normal.         Behavior: Behavior normal.         Thought Content: Thought content normal.             MEDICAL DECISION MAKING   Initial Assessment: 51-year-old female presents with right flank pain. Onset 4 days with associated chills, right flank pain, right suprapubic pain, polyuria, dysuria, hematuria. Initially presented to urgent care, transferred to [facility] for concerns and further work-up of nephrolithiasis. Reports prior history of nephrolithiasis and UTI. Denies prior abdominal surgeries. Sexually active, denies being pregnant. 1. UTI/cystitis  2. Nephrolithiasis  3. Pyelonephritis  4. Pregnancy  5. Unlikely cholecystitis/choledocholithiasis  Plan:    Check CBC and BMP to evaluate electrolytes and hydration status.  Check serum hCG   Ordered EKG and check troponin   Order CT abdomen pelvis   Given a liter NS bolus x1   Zofran 4 mg IV x1, Toradol 30 mg IV x1 for symptomatic management        ED RESULTS   Laboratory results:  Labs Reviewed   CBC WITH AUTO DIFFERENTIAL - Abnormal; Notable for the following components:       Result Value    MPV 9.1 (*)     All other components within normal limits   BASIC METABOLIC PANEL W/ REFLEX TO MG FOR LOW K - Abnormal; Notable for the following components:    Glucose 116 (*)     All other components within normal limits   URINE WITH REFLEXED MICRO - Abnormal; Notable for the following components:    Blood, Urine LARGE (*)     Character, Urine CLOUDY (*)     All other components within normal limits   CULTURE, REFLEXED, URINE   HCG, SERUM, QUALITATIVE   TROPONIN   ANION GAP   OSMOLALITY   GLOMERULAR FILTRATION RATE, ESTIMATED       Radiologic studies results:  CT ABDOMEN PELVIS WO CONTRAST Additional Contrast? None   Final Result   No acute abdominal or pelvic abnormalities            **This report has been created using voice recognition software.   It may contain minor errors which are inherent in voice recognition technology. **      Final report electronically signed by Dr. Derrell Yates on 4/16/2022 4:01 PM          ED Medications administered this visit:   Medications   0.9 % sodium chloride bolus (1,000 mLs IntraVENous New Bag 4/16/22 1621)   ketorolac (TORADOL) injection 30 mg (30 mg IntraVENous Given 4/16/22 1622)   ondansetron (ZOFRAN) injection 4 mg (4 mg IntraVENous Given 4/16/22 1622)         ED COURSE      ED, vitals T-max 98.5, RR 18, HR 83, /65, SPO2 96%. Labs nonsignificant. Troponin <0.010. EKG revealed NSR with ventricular rate 64. Serum HCG negative. UA nonsignificant. CT A/P demonstrated no acute abdominal or pelvic abnormalities. She was given Zofran 4 mg IV x1, Toradol 30 mg IV x1 and started on 1 L NS bolus for fluid hydration. Discussed results with the patient. This likely viral. Advised patient to follow up with [facility]. Instructed the patient to return to the ED if symptoms continue to worsen. Will discharge with zofran. Strict return precautions and follow up instructions were discussed with the patient prior to discharge, with which the patient agrees. MEDICATION CHANGES     New Prescriptions    ONDANSETRON (ZOFRAN) 4 MG TABLET    Take 1 tablet by mouth 3 times daily as needed for Nausea or Vomiting         FINAL DISPOSITION     Final diagnoses:   Acute right flank pain     Condition: condition: fair  Dispo: Discharge to home      This transcription was electronically signed. Parts of this transcriptions may have been dictated by use of voice recognition software and electronically transcribed, and parts may have been transcribed with the assistance of an ED scribe. The transcription may contain errors not detected in proofreading. Please refer to my supervising physician's documentation if my documentation differs.     Electronically Signed: Anthony Mills DO, 04/16/22, 4:38 PM       Anthony Mills DO  Resident  04/16/22 1627

## 2022-04-16 NOTE — ED PROVIDER NOTES
[address], Pt Name: Nikkie Long  MRN: [id number]  [place] 2000  Date of evaluation: 9/12/20      I personally saw and examined the patient. I have reviewed and agree with the Resident findings, including all diagnostic interpretations and treatment plans as written. I was present for the key portion of any procedures performed and the inclusive time noted in any critical care statement. History: This patient was seen with Dr June Borja, resident physician. This Is a 70-year-old female who was sent in from the urgent care, for work-up for possible kidney stone although appears quite comfortable at the time of my evaluation. She did describe right-sided flank pain. No radiculopathy. No midline back pain. There was blood in the urine but the CT appears negative. No evidence of ureteral pathology. Leukocyte esterase and nitrates are negative. She can follow-up on an outpatient basis.       Diagnosis: Right flank pain                DO Stevie Mojica DO  04/16/22 401

## 2022-04-16 NOTE — ED NOTES
Pt in through ED lobby from [facility]. She is having right sided flank pain. She was seen in January for the same type of pain and was found to have kidney stones.       Tung Hess RN  04/16/22 1549

## 2022-04-16 NOTE — ED NOTES
Patient presents to [facility] with significant other with complaints of right flank pain x3 days.  Patient states she has had previous kidney stones in the past.      Asher Keating, RN  04/16/22 9705

## 2022-04-16 NOTE — ED PROVIDER NOTES
[address] Encounter      CHIEFCOMPLAINT     Flank Pain    Nurses Notes reviewed and I agree except as noted in the HPI. HISTORY OF PRESENT ILLNESS   Zak Lewis is a 24 y.o. female w/ PMH of nephrolithiasis who presents for right flank pain. Has been present for 3 days, worsening. Tried some motrin for the pain, which helps a little. Denies fever. Urinating well. Having periods so not sure if blood in urine is from stone. Reportedly had diagnosed stones 3 months ago. Eating and drinking well. REVIEW OF SYSTEMS     Review of Systems   Constitutional: Negative for appetite change and unexpected weight change. Eyes: Negative for pain and visual disturbance. Respiratory: Negative for cough and shortness of breath. Cardiovascular: Negative for chest pain and leg swelling. Gastrointestinal: Negative for abdominal pain, constipation and diarrhea. Genitourinary: Positive for dysuria, hematuria and pelvic pain. Negative for difficulty urinating and menstrual problem. Musculoskeletal: Negative for arthralgias and myalgias. Psychiatric/Behavioral: Negative for behavioral problems and sleep disturbance. PAST MEDICAL HISTORY         Diagnosis Date    Cysts of both ovaries        SURGICAL HISTORY     Patient  has a past surgical history that includes Tonsillectomy and adenoidectomy and Tympanostomy tube placement. CURRENT MEDICATIONS       Previous Medications    ESCITALOPRAM (LEXAPRO) 20 MG TABLET    Take 1 tablet by mouth daily    IBUPROFEN (IBU) 400 MG TABLET    Take 1 tablet by mouth every 6 hours as needed for Pain    ONDANSETRON (ZOFRAN) 4 MG TABLET    Take 1 tablet by mouth 3 times daily as needed for Nausea or Vomiting    SPRINTEC 28 0.25-35 MG-MCG PER TABLET    TAKE 1 TABLET BY MOUTH EVERY DAY       ALLERGIES     Patient is has No Known Allergies.     FAMILY HISTORY     Patient'sfamily history includes Breast Cancer in her maternal aunt; Diabetes in her maternal grandmother and paternal grandmother. SOCIAL HISTORY     Patient  reports that she has been smoking e-cigarettes. She has never used smokeless tobacco. She reports previous alcohol use. She reports current drug use. Drug: Marijuana Myrtis Regulus). PHYSICAL EXAM     ED TRIAGE VITALS  BP: 107/64, Temp: 97.6 °F (36.4 °C), Pulse: 96, Resp: 16, SpO2: 98 %  Physical Exam  Vitals and nursing note reviewed. Constitutional:       General: She is not in acute distress. HENT:      Head: Normocephalic and atraumatic. Nose: Nose normal.      Mouth/Throat:      Mouth: Mucous membranes are moist.      Pharynx: Oropharynx is clear. Eyes:      Extraocular Movements: Extraocular movements intact. Pupils: Pupils are equal, round, and reactive to light. Cardiovascular:      Rate and Rhythm: Normal rate and regular rhythm. Pulses: Normal pulses. Heart sounds: Normal heart sounds. Pulmonary:      Effort: Pulmonary effort is normal.      Breath sounds: Normal breath sounds. Abdominal:      Palpations: Abdomen is soft. Tenderness: There is no abdominal tenderness. There is right CVA tenderness. Musculoskeletal:         General: No signs of injury. Normal range of motion. Cervical back: Normal range of motion and neck supple. Skin:     General: Skin is warm and dry. Capillary Refill: Capillary refill takes less than 2 seconds. Neurological:      General: No focal deficit present. Mental Status: She is alert and oriented to person, place, and time. Mental status is at baseline. Psychiatric:         Mood and Affect: Mood normal.         Behavior: Behavior normal.         DIAGNOSTIC RESULTS   Labs:No results found for this visit on 04/16/22.     IMAGING:  No orders to display     URGENT CARE COURSE:     Vitals:    04/16/22 1314   BP: 107/64   Pulse: 96   Resp: 16   Temp: 97.6 °F (36.4 °C)   TempSrc: Tympanic   SpO2: 98%   Weight: 120 lb (54.4 kg)   Height: 5' 4\" (1.626 m)       Medications - No data to display  PROCEDURES:  FINALIMPRESSION      1. Acute right flank pain        DISPOSITION/PLAN   DISPOSITION Decision To Transfer 04/16/2022 01:26:22 PM    Patient was seen and evaluated here in the urgent care. Patient was in no acute distress. Vitals signs were within normal limit. For right flank pain, possible nephrolithiasis. Discussed need for further evaluation at ED. Patient agreeable with plan and will go by private vehicle.       PATIENT REFERRED TO:  [address] [place] [id number]  In 1 week  If symptoms worsen    DISCHARGE MEDICATIONS:  New Prescriptions    No medications on file     Current Discharge Medication List          MD Eddie Shah MD  Resident  04/16/22 1328       Eddie Lewis MD  Resident  04/16/22 2019

## 2022-04-16 NOTE — ED PROVIDER NOTES
[address] Gilliam     No chief complaint on file. Nurses Notes reviewed and I agree except as noted in the HPI. HISTORY OF PRESENT ILLNESS   Gonzalo Ferraro is a 24 y.o. female who presents with renal colic. Temitope Cheatham MD,  personally performed and participated in key or critical portions of the evaluation and management including personally performing the exam and medical decision making. I verify the accuracy of the documentation by the resident. Please review resident note for specifics and further details of this urgent care evaluation.      Electronically signed by Zulema Delgado MD on 4/16/2022 at 1:21 PM       Zulema Delgado MD  04/16/22 8775 Detail Level: Detailed Quality 110: Preventive Care And Screening: Influenza Immunization: Influenza Immunization previously received during influenza season

## 2022-04-16 NOTE — ED NOTES
Patient resting in bed. Respirations easy and unlabored. No distress noted. Call light within reach.   Pt returned from [place] [address], [address] [facility]  04/16/22 8991

## 2022-04-16 NOTE — LETTER
[facility] [address] [address] EMERGENCY DEPT  [address] [place] [address]  Phone: [phone]               April 16, 2022    Patient: Maxi De León   YOB: 2000   Date of Visit: 4/16/2022       To Whom It May Concern:    Rosalba Lorenzo was seen and treated in our emergency department on 4/16/2022. She may return to work on 04/18/2022.       Sincerely,       Maria M Azevedo RN         Signature:__________________________________

## 2022-04-16 NOTE — ED NOTES
Pt medicated per mar  Line started   Pt rates pain 7/10 at this time, will reassess      Silvano Tejada RN  04/16/22 5224

## 2022-04-17 LAB
EKG ATRIAL RATE: 64 BPM
EKG P AXIS: 76 DEGREES
EKG P-R INTERVAL: 134 MS
EKG Q-T INTERVAL: 412 MS
EKG QRS DURATION: 92 MS
EKG QTC CALCULATION (BAZETT): 425 MS
EKG R AXIS: 88 DEGREES
EKG T AXIS: 76 DEGREES
EKG VENTRICULAR RATE: 64 BPM
ORGANISM: ABNORMAL
URINE CULTURE REFLEX: ABNORMAL

## 2022-04-17 PROCEDURE — 93010 ELECTROCARDIOGRAM REPORT: CPT | Performed by: INTERNAL MEDICINE

## 2022-04-19 NOTE — TELEPHONE ENCOUNTER
Zak Goel's pharmacy is requesting a refill on the following medications:  Requested Prescriptions     Pending Prescriptions Disp Refills    escitalopram (LEXAPRO) 20 MG tablet [Pharmacy Med Name: ESCITALOPRAM 20 MG TABLET] 30 tablet 0     Sig: TAKE 1 TABLET BY MOUTH EVERY DAY     She will run out prior to next appointment.      Date of last visit: 3/10/2021  Date of next visit (if applicable):4/27/2022  Pharmacy Name: [facility] [place], [place]

## 2022-04-20 RX ORDER — ESCITALOPRAM OXALATE 20 MG/1
TABLET ORAL
Qty: 30 TABLET | Refills: 0 | Status: SHIPPED | OUTPATIENT
Start: 2022-04-20 | End: 2022-04-27 | Stop reason: SDUPTHER

## 2022-04-27 ENCOUNTER — OFFICE VISIT (OUTPATIENT)
Dept: PSYCHIATRY | Age: 22
End: 2022-04-27
Payer: MEDICAID

## 2022-04-27 DIAGNOSIS — F12.20 CANNABIS USE DISORDER, SEVERE, DEPENDENCE (HCC): ICD-10-CM

## 2022-04-27 DIAGNOSIS — F34.1 PERSISTENT DEPRESSIVE DISORDER: Primary | ICD-10-CM

## 2022-04-27 PROCEDURE — G8428 CUR MEDS NOT DOCUMENT: HCPCS | Performed by: NURSE PRACTITIONER

## 2022-04-27 PROCEDURE — 99213 OFFICE O/P EST LOW 20 MIN: CPT | Performed by: NURSE PRACTITIONER

## 2022-04-27 PROCEDURE — G8420 CALC BMI NORM PARAMETERS: HCPCS | Performed by: NURSE PRACTITIONER

## 2022-04-27 PROCEDURE — 4004F PT TOBACCO SCREEN RCVD TLK: CPT | Performed by: NURSE PRACTITIONER

## 2022-04-27 RX ORDER — ESCITALOPRAM OXALATE 20 MG/1
20 TABLET ORAL DAILY
Qty: 90 TABLET | Refills: 0 | Status: SHIPPED | OUTPATIENT
Start: 2022-04-27 | End: 2022-08-17

## 2022-04-27 NOTE — PROGRESS NOTES
Progress Note             4-    CC: Persistent Depressive D/O   Cannabis Use D/O severe      HPI   Zak is seen for F/U and medication management. Transfer client from [place] [address]. Rxed Lexapro reports works well for her. Denies having side effects. Good med compliance is reported. Denies having depression. Denies feelings of harm towards self or others. Reports appetite and sleep are \"good\"  Reports getting along well with boyfriend. 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 Reports is going to pursue counseling. Reports smokes MJ daily for years. Denies needign help with this. Labs reviewed drawn Jan 2022 relfect no critical abnormals. Pregnancy negative. Past Medical Hx:  Past Medical History:   Diagnosis Date    Cysts of both ovaries     No Known Allergies   Reports taking oral BC    Past Psychiatric Hx:  Denies any past inpatient psych hospitalizations Denies any past suicidal gestures. 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[place] [address] Road: Graduated high school   LIVING SITUATION: Lives  Lives with boyfriend in [address] [place], [place] [address]ite:  Level of consciousness: Alert  Appearance: in chair and fair grooming   Behavior/Motor: no abnormalities noted   Attitude toward examiner: cooperative   Speech: Normal volume, goal directed, NRR  Mood: Euthymic  Affect: Reactive  Thought processes: Linear and goal directed   Suicidal Ideation: Denies suicidal ideations  Homicidal ideation: Denies homicidal ideations  Delusions: No evidence of delusions is observed  Perceptual Disturbance: Denies AH/VH;  No evidence of psychosis is observed.   Cognition: Oriented to person, place, time and situation   Concentration fair   Memory intact   Insight: Fair  Judgment: Fair     ROS:  Constitutional: Negative for appetite change, diaphoresis, fatigue and fever. HENT: Negative for congestion, sore throat and tinnitus. Eyes: Negative for visual disturbance. Respiratory: Negative for cough, shortness of breath and wheezing. Cardiovascular: Negative for chest pain and leg swelling. Gastrointestinal: Negative for nausea, vomiting, diarrhea. Negative for abdominal pain. Genitourinary: Negative for frequency. Musculoskeletal: Negative for arthralgias, myalgias and neck stiffness. Skin: Negative for puritis. Neurological: Negative for dizziness, weakness and headaches. All other systems reviewed and are negative. Impression:  Persistent Depressive D/O   Cannabis Use D/O severe      Plan:   Cont Lexapro 20mg po q am   Medication education givne. Pt voiced understansing  Katiana givne risks of being on Lexapro if becomes pregnanat. Katiana voiced understansing of risks and states she wants to continue taking Lexapro. Zak advised to call 911 and or go to nearest ED if symptoms worsen or has feelings of harm towards self or others. Zak voiced understansing and agreement with safety plan.    Advised to pursue counseling     Time spent: 20 minutes  RTC 3 mos sooner if needed

## 2022-05-07 ENCOUNTER — HOSPITAL ENCOUNTER (EMERGENCY)
Age: 22
Discharge: HOME | End: 2022-05-07
Payer: MEDICAID

## 2022-05-07 VITALS
SYSTOLIC BLOOD PRESSURE: 120 MMHG | OXYGEN SATURATION: 98 % | RESPIRATION RATE: 16 BRPM | TEMPERATURE: 99.4 F | HEART RATE: 86 BPM | DIASTOLIC BLOOD PRESSURE: 60 MMHG

## 2022-05-07 DIAGNOSIS — J01.40 ACUTE PANSINUSITIS, RECURRENCE NOT SPECIFIED: Primary | ICD-10-CM

## 2022-05-07 PROCEDURE — 99213 OFFICE O/P EST LOW 20 MIN: CPT

## 2022-05-07 PROCEDURE — 99213 OFFICE O/P EST LOW 20 MIN: CPT | Performed by: EMERGENCY MEDICINE

## 2022-05-07 RX ORDER — FLUTICASONE PROPIONATE 50 MCG
1 SPRAY, SUSPENSION (ML) NASAL DAILY
Qty: 16 G | Refills: 0 | Status: SHIPPED | OUTPATIENT
Start: 2022-05-07

## 2022-05-07 RX ORDER — GREEN TEA/HOODIA GORDONII 315-12.5MG
1 CAPSULE ORAL 2 TIMES DAILY
Qty: 60 TABLET | Refills: 0 | Status: SHIPPED | OUTPATIENT
Start: 2022-05-07 | End: 2022-06-06

## 2022-05-07 RX ORDER — AMOXICILLIN AND CLAVULANATE POTASSIUM 875; 125 MG/1; MG/1
1 TABLET, FILM COATED ORAL 2 TIMES DAILY
Qty: 20 TABLET | Refills: 0 | Status: SHIPPED | OUTPATIENT
Start: 2022-05-07 | End: 2022-05-17

## 2022-05-07 RX ORDER — PSEUDOEPHEDRINE HYDROCHLORIDE 30 MG/1
60 TABLET ORAL EVERY 6 HOURS PRN
Qty: 30 TABLET | Refills: 1 | Status: SHIPPED | OUTPATIENT
Start: 2022-05-07 | End: 2022-05-14

## 2022-05-07 ASSESSMENT — PAIN - FUNCTIONAL ASSESSMENT: PAIN_FUNCTIONAL_ASSESSMENT: 0-10

## 2022-05-07 ASSESSMENT — ENCOUNTER SYMPTOMS
SORE THROAT: 1
RHINORRHEA: 1
SINUS PAIN: 1
COUGH: 1
SHORTNESS OF BREATH: 0
ABDOMINAL PAIN: 0
SINUS PRESSURE: 1

## 2022-05-07 ASSESSMENT — PAIN DESCRIPTION - LOCATION: LOCATION: HEAD;THROAT

## 2022-05-07 ASSESSMENT — PAIN SCALES - GENERAL: PAINLEVEL_OUTOF10: 5

## 2022-05-07 NOTE — ED TRIAGE NOTES
Patient understood instructions verbally,  Follow up with PCP with any concerns, prescription,  work excuse with patient. Worse symptoms follow up with ED.ambulated self to lobby,stable condition. All belongings with patient.

## 2022-05-07 NOTE — ED PROVIDER NOTES
[facility]       CHIEF COMPLAINT       Chief Complaint   Patient presents with    Sinus Problem       Nurses Notes reviewed and I agree except as noted in the HPI. HISTORY OF PRESENT ILLNESS   Viridiana Busby is a 24 y.o. female who presents for 3 to 4 weeks of sinus pressure, congestion, dizziness, ear fullness, postnasal drip. Patient now has mild cough. She has had periods of fever over the past 3 weeks. She has taken DayQuil and NyQuil with some improvement of symptoms. HPI    REVIEW OF SYSTEMS     Review of Systems   Constitutional: Positive for fever. Negative for appetite change and fatigue. HENT: Positive for congestion, rhinorrhea, sinus pressure, sinus pain and sore throat. Respiratory: Positive for cough. Negative for shortness of breath. Cardiovascular: Negative for chest pain. Gastrointestinal: Negative for abdominal pain. Neurological: Positive for dizziness and headaches. Psychiatric/Behavioral: Negative for behavioral problems. PAST MEDICAL HISTORY         Diagnosis Date    Cysts of both ovaries     Depression     persistant depressive disorder       SURGICALHISTORY     Patient  has a past surgical history that includes Tonsillectomy and adenoidectomy and Tympanostomy tube placement. CURRENT MEDICATIONS       Discharge Medication List as of 5/7/2022  6:22 PM      CONTINUE these medications which have NOT CHANGED    Details   escitalopram (LEXAPRO) 20 MG tablet Take 1 tablet by mouth daily, Disp-90 tablet, R-0Normal      ondansetron (ZOFRAN) 4 MG tablet Take 1 tablet by mouth 3 times daily as needed for Nausea or Vomiting, Disp-15 tablet, R-0Normal      SPRINTEC 28 0.25-35 MG-MCG per tablet TAKE 1 TABLET BY MOUTH EVERY DAY, DAWHistorical Med             ALLERGIES     Patient is has No Known Allergies.     Patients   Immunization History   Administered Date(s) Administered    DTaP vaccine 2000, 2000, 01/11/2001, 09/25/2001, 05/05/2005    HPV (Human Papilloma Virus)Vaccine 12/04/2012, 09/07/2017    HPV 9-valent Charlotta Joan) 03/05/2021    HPV Quadrivalent (Gardasil) 09/14/2012    Hep B/Hib (Comvax) 2000, 01/11/2001    Hepatitis A Ped/Adol (Havrix, Vaqta) 09/14/2012, 09/07/2017    Hepatitis B Ped/Adol (Engerix-B, Recombivax HB) 2000    Hib vaccine 2000, 08/08/2001    Influenza A (Q5Q4-37) Vaccine Nasal 11/16/2009, 12/16/2009    Influenza Live, intranasal, LAIV3 11/11/2010, 12/04/2012    MMR 08/08/2001, 05/05/2005    Meningococcal MCV4P (Menactra) 12/04/2012, 09/07/2017    Pneumococcal Conjugate 7-valent (Lashay Netters) 08/08/2001, 10/25/2001    Polio IPV (IPOL) 2000, 2000, 08/08/2001, 05/05/2005    Tdap (Boostrix, Adacel) 09/14/2012, 01/25/2021    Varicella (Varivax) 09/25/2001, 09/14/2012       FAMILY HISTORY     Patient's family history includes Breast Cancer in her maternal aunt; Diabetes in her maternal grandmother and paternal grandmother. SOCIAL HISTORY     Patient  reports that she has been smoking e-cigarettes. She has never used smokeless tobacco. She reports previous alcohol use. She reports current drug use. Drug: Marijuana Carlos Chaney). PHYSICAL EXAM     ED TRIAGE VITALS  BP: 120/60, Temp: 99.4 °F (37.4 °C), Pulse: 86, Resp: 16, SpO2: 98 %,Estimated body mass index is 20.6 kg/m² as calculated from the following:    Height as of 4/16/22: 5' 4\" (1.626 m). Weight as of 4/16/22: 120 lb (54.4 kg). ,Patient's last menstrual period was 04/14/2022. Physical Exam  Constitutional:       General: She is not in acute distress. Appearance: She is normal weight. She is not ill-appearing. HENT:      Head: Normocephalic. Right Ear: Tympanic membrane and ear canal normal.      Left Ear: Tympanic membrane and ear canal normal.      Nose: Congestion and rhinorrhea present. Right Sinus: Maxillary sinus tenderness and frontal sinus tenderness present.       Left Sinus: Maxillary sinus tenderness and frontal sinus tenderness present. Mouth/Throat:      Mouth: Mucous membranes are moist.   Cardiovascular:      Rate and Rhythm: Normal rate and regular rhythm. Pulses: Normal pulses. Heart sounds: Normal heart sounds. Pulmonary:      Effort: Pulmonary effort is normal. No respiratory distress. Breath sounds: Normal breath sounds. No wheezing. Neurological:      General: No focal deficit present. Mental Status: She is alert and oriented to person, place, and time. Psychiatric:         Mood and Affect: Mood normal.         Behavior: Behavior normal.         DIAGNOSTIC RESULTS     Labs:No results found for this visit on 05/07/22. IMAGING:    No orders to display         EKG:      URGENT CARE COURSE:     Vitals:    05/07/22 1752   BP: 120/60   Pulse: 86   Resp: 16   Temp: 99.4 °F (37.4 °C)   TempSrc: Temporal   SpO2: 98%       Medications - No data to display         PROCEDURES:  None    FINAL IMPRESSION      1. Acute pansinusitis, recurrence not specified          DISPOSITION/ PLAN     Patient presents for acute pansinusitis. Will place on Augmentin and Flonase for treatment of symptoms. We will also place on Sudafed as decongestant. Probiotic to prevent side effects of the antibiotics. Advised to drink plenty of fluids. Tylenol or ibuprofen as needed for pain. Return for new or worsening symptoms. PATIENT REFERRED TO:  No primary care provider on file. No primary physician on file.       DISCHARGE MEDICATIONS:  Discharge Medication List as of 5/7/2022  6:22 PM      START taking these medications    Details   fluticasone (FLONASE) 50 MCG/ACT nasal spray 1 spray by Each Nostril route daily, Disp-16 g, R-0Normal      amoxicillin-clavulanate (AUGMENTIN) 875-125 MG per tablet Take 1 tablet by mouth 2 times daily for 10 days, Disp-20 tablet, R-0Normal      Probiotic Acidophilus (FLORANEX) TABS Take 1 tablet by mouth 2 times daily, Disp-60 tablet, R-0Normal      pseudoephedrine (DECONGESTANT) 30 MG tablet Take 2 tablets by mouth every 6 hours as needed for Congestion, Disp-30 tablet, R-1Normal             Discharge Medication List as of 5/7/2022  6:22 PM      STOP taking these medications       ibuprofen (IBU) 400 MG tablet Comments:   Reason for Stopping:               Discharge Medication List as of 5/7/2022  6:22 PM          KARINE Cisneros CNP    (Please note that portions of this note were completed with a voice recognition program. Efforts were made to edit the dictations but occasionally words are mis-transcribed.)          KARINE Cisneros CNP  05/07/22 5465

## 2022-05-07 NOTE — Clinical Note
Savanna Gray was seen and treated in our emergency department on 5/7/2022. She may return to work on 05/09/2022. If you have any questions or concerns, please don't hesitate to call.       KARINE Pham - CNP

## 2022-05-07 NOTE — ED TRIAGE NOTES
Sinus congestion, felt like a fever/chills off and on, green drainage, has been going on for a month, has had a sore throat, face pressure, had a negative covid test 2 weeks ago

## 2022-08-17 RX ORDER — ESCITALOPRAM OXALATE 20 MG/1
TABLET ORAL
Qty: 30 TABLET | Refills: 0 | Status: SHIPPED | OUTPATIENT
Start: 2022-08-17 | End: 2022-10-05 | Stop reason: SDUPTHER

## 2022-09-13 RX ORDER — ESCITALOPRAM OXALATE 20 MG/1
TABLET ORAL
Qty: 30 TABLET | Refills: 0 | OUTPATIENT
Start: 2022-09-13

## 2022-09-15 RX ORDER — ESCITALOPRAM OXALATE 20 MG/1
TABLET ORAL
Qty: 30 TABLET | Refills: 0 | OUTPATIENT
Start: 2022-09-15

## 2022-10-03 NOTE — TELEPHONE ENCOUNTER
Allen Kinsey wrote into the office stating that she needed to schedule an appointment as well as refill on her Lexapro 20mg;#30 with 0 refills;last with a start date of 08/17/22. She had been scheduled for the next available on 10/26/2022. Medication is pending your approval for a 30day supply with 0 refills; she was last seen on 04/27/22.

## 2022-10-05 RX ORDER — ESCITALOPRAM OXALATE 20 MG/1
20 TABLET ORAL DAILY
Qty: 30 TABLET | Refills: 0 | Status: SHIPPED | OUTPATIENT
Start: 2022-10-05 | End: 2022-10-26 | Stop reason: SDUPTHER

## 2022-10-26 ENCOUNTER — TELEMEDICINE (OUTPATIENT)
Dept: PSYCHIATRY | Age: 22
End: 2022-10-26
Payer: MEDICAID

## 2022-10-26 DIAGNOSIS — F34.1 PERSISTENT DEPRESSIVE DISORDER: Primary | ICD-10-CM

## 2022-10-26 DIAGNOSIS — F12.20 CANNABIS USE DISORDER, SEVERE, DEPENDENCE (HCC): ICD-10-CM

## 2022-10-26 PROCEDURE — G8484 FLU IMMUNIZE NO ADMIN: HCPCS | Performed by: NURSE PRACTITIONER

## 2022-10-26 PROCEDURE — 4004F PT TOBACCO SCREEN RCVD TLK: CPT | Performed by: NURSE PRACTITIONER

## 2022-10-26 PROCEDURE — G8420 CALC BMI NORM PARAMETERS: HCPCS | Performed by: NURSE PRACTITIONER

## 2022-10-26 PROCEDURE — G8428 CUR MEDS NOT DOCUMENT: HCPCS | Performed by: NURSE PRACTITIONER

## 2022-10-26 PROCEDURE — 99214 OFFICE O/P EST MOD 30 MIN: CPT | Performed by: NURSE PRACTITIONER

## 2022-10-26 RX ORDER — ESCITALOPRAM OXALATE 20 MG/1
20 TABLET ORAL DAILY
Qty: 90 TABLET | Refills: 0 | Status: SHIPPED | OUTPATIENT
Start: 2022-10-26

## 2022-10-26 NOTE — PROGRESS NOTES
Mildred Robertson, was evaluated through a synchronous (real-time) audio-video encounter. The patient (or guardian if applicable) is aware that this is a billable service, which includes applicable co-pays. This Virtual Visit was conducted with patient's (and/or legal guardian's) consent. The visit was conducted pursuant to the emergency declaration under the [facility], [address] [place] [address] authority and the [employer] and [employer] General Act. Patient identification was verified, and a caregiver was present when appropriate. The patient was located at [facility]  Provider was located at [facility]       Total time spent for this encounter: KARLA    --KARINE Brown CNP on 10/26/2022 at 6:24 PM    An electronic signature was used to authenticate this note. Progress Note                                                                            10-      CC: Persistent Depressive D/O   Cannabis Use D/O severe      HPI   Zak is seen for F/U and medication management. Reports Lexapro is working \"ok\" Denies having side effects. Good med compliance is reported. Denies having depression. but reports dealing with sitautional stressors. Reports recent break up with boyfriend and reports has to find a new place to live. Reports has new boyfriend. And may live with him. Denies feelings of harm towards self or others. 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 .Reports smokes MJ daily for years. Still denies need for help with this. Reports again tody will try and pursue counseling. Labs reviewed drawn Jan 2022 relfect no critical abnormals. Pregnancy negative.       Past Medical Hx:  Past Medical History        Past Medical History:   Diagnosis Date    Cysts of both ovaries         No Known Allergies   Reports still taking oral BC     Past Psychiatric Hx:  Denies any past inpatient psych hospitalizations Denies any past suicidal gestures. 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 [place] [address]:  Level of consciousness: Alert  Appearance: in chair and fair grooming   Behavior/Motor: no abnormalities noted   Attitude toward examiner: cooperative   Speech: Normal volume, goal directed, NRR  Mood: Euthymic  Affect: Reactive  Thought processes: Linear and goal directed   Suicidal Ideation: Denies suicidal ideations  Homicidal ideation: Denies homicidal ideations  Delusions: No evidence of delusions is observed  Perceptual Disturbance: Denies AH/VH;  No evidence of psychosis is observed. Cognition: Oriented to person, place, time and situation   Concentration fair   Memory intact   Insight: Fair  Judgment: Fair     ROS:  Constitutional: Negative for appetite change, diaphoresis, fatigue and fever. HENT: Negative for congestion, sore throat and tinnitus. Eyes: Negative for visual disturbance. Respiratory: Negative for cough, shortness of breath and wheezing. Cardiovascular: Negative for chest pain and leg swelling. Gastrointestinal: Negative for nausea, vomiting, diarrhea. Negative for abdominal pain. Genitourinary: Negative for frequency. Musculoskeletal: Negative for arthralgias, myalgias and neck stiffness. Skin: Negative for puritis. Neurological: Negative for dizziness, weakness and headaches. All other systems reviewed and are negative. Impression:  Persistent Depressive D/O   Cannabis Use D/O severe       Plan:   Cont Lexapro 20mg po q am   Medication education givne.  Pt voiced understansing  Zak again given risks of being on Lexapro if becomes pregnanat. Zakvoiced understansing of risks and states she wants to continue taking Lexapro. Zak advised to call 911 and or go to nearest ED if symptoms worsen or has feelings of harm towards self or others. Zak voiced understansing and agreement with safety plan. Advised again to  pursue counseling ; Will send list of counselors.    RTC 3 mos ; sooner if needed

## 2022-11-01 RX ORDER — ESCITALOPRAM OXALATE 20 MG/1
TABLET ORAL
Qty: 30 TABLET | OUTPATIENT
Start: 2022-11-01

## 2023-01-25 ENCOUNTER — TELEMEDICINE (OUTPATIENT)
Dept: PSYCHIATRY | Age: 23
End: 2023-01-25
Payer: MEDICAID

## 2023-01-25 DIAGNOSIS — F34.1 PERSISTENT DEPRESSIVE DISORDER: Primary | ICD-10-CM

## 2023-01-25 DIAGNOSIS — F12.20 CANNABIS USE DISORDER, SEVERE, DEPENDENCE (HCC): ICD-10-CM

## 2023-01-25 PROCEDURE — 4004F PT TOBACCO SCREEN RCVD TLK: CPT | Performed by: NURSE PRACTITIONER

## 2023-01-25 PROCEDURE — G8428 CUR MEDS NOT DOCUMENT: HCPCS | Performed by: NURSE PRACTITIONER

## 2023-01-25 PROCEDURE — 99214 OFFICE O/P EST MOD 30 MIN: CPT | Performed by: NURSE PRACTITIONER

## 2023-01-25 PROCEDURE — G8484 FLU IMMUNIZE NO ADMIN: HCPCS | Performed by: NURSE PRACTITIONER

## 2023-01-25 PROCEDURE — G8420 CALC BMI NORM PARAMETERS: HCPCS | Performed by: NURSE PRACTITIONER

## 2023-01-25 RX ORDER — ESCITALOPRAM OXALATE 20 MG/1
20 TABLET ORAL DAILY
Qty: 90 TABLET | Refills: 0 | Status: SHIPPED | OUTPATIENT
Start: 2023-01-25
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
## 2023-03-18 ENCOUNTER — APPOINTMENT (OUTPATIENT)
Dept: GENERAL RADIOLOGY | Age: 23
End: 2023-03-18
Payer: OTHER MISCELLANEOUS

## 2023-03-18 ENCOUNTER — APPOINTMENT (OUTPATIENT)
Dept: CT IMAGING | Age: 23
End: 2023-03-18
Payer: OTHER MISCELLANEOUS

## 2023-03-18 ENCOUNTER — HOSPITAL ENCOUNTER (EMERGENCY)
Age: 23
Discharge: HOME | End: 2023-03-18
Attending: EMERGENCY MEDICINE
Payer: OTHER MISCELLANEOUS

## 2023-03-18 VITALS
SYSTOLIC BLOOD PRESSURE: 101 MMHG | TEMPERATURE: 98.3 F | WEIGHT: 125 LBS | HEART RATE: 79 BPM | DIASTOLIC BLOOD PRESSURE: 51 MMHG | BODY MASS INDEX: 21.46 KG/M2 | OXYGEN SATURATION: 99 % | RESPIRATION RATE: 14 BRPM
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
## 2023-03-18 LAB
ALBUMIN SERPL BCG-MCNC: 4.3 G/DL (ref 3.5–5.1)
ALP SERPL-CCNC: 44 U/L (ref 38–126)
ALT SERPL W/O P-5'-P-CCNC: 12 U/L (ref 11–66)
AMORPH SED URNS QL MICRO: ABNORMAL
AMPHETAMINES UR QL SCN: NEGATIVE
ANION GAP SERPL CALC-SCNC: 9 MEQ/L (ref 8–16)
AST SERPL-CCNC: 15 U/L (ref 5–40)
B-HCG SERPL QL: NEGATIVE
BACTERIA URNS QL MICRO: ABNORMAL /HPF
BARBITURATES UR QL SCN: NEGATIVE
BASOPHILS ABSOLUTE: 0 THOU/MM3 (ref 0–0.1)
BASOPHILS NFR BLD AUTO: 0.5 %
BENZODIAZ UR QL SCN: NEGATIVE
BILIRUB SERPL-MCNC: 0.2 MG/DL (ref 0.3–1.2)
BILIRUB UR QL STRIP.AUTO: NEGATIVE
BUN SERPL-MCNC: 10 MG/DL (ref 7–22)
BZE UR QL SCN: NEGATIVE
CALCIUM SERPL-MCNC: 8.8 MG/DL (ref 8.5–10.5)
CANNABINOIDS UR QL SCN: POSITIVE
CASTS #/AREA URNS LPF: ABNORMAL /LPF
CASTS 2: ABNORMAL /LPF
CHARACTER UR: ABNORMAL
CHLORIDE SERPL-SCNC: 104 MEQ/L (ref 98–111)
CO2 SERPL-SCNC: 26 MEQ/L (ref 23–33)
COLOR: YELLOW
CREAT SERPL-MCNC: 0.6 MG/DL (ref 0.4–1.2)
CRYSTALS URNS MICRO: ABNORMAL
DEPRECATED RDW RBC AUTO: 44.3 FL (ref 35–45)
EOSINOPHIL NFR BLD AUTO: 2.3 %
EOSINOPHILS ABSOLUTE: 0.2 THOU/MM3 (ref 0–0.4)
EPITHELIAL CELLS, UA: ABNORMAL /HPF
ERYTHROCYTE [DISTWIDTH] IN BLOOD BY AUTOMATED COUNT: 12.8 % (ref 11.5–14.5)
ETHANOL SERPL-MCNC: < 0.01 %
FENTANYL: NEGATIVE
GFR SERPL CREATININE-BSD FRML MDRD: > 60 ML/MIN/1.73M2
GLUCOSE SERPL-MCNC: 88 MG/DL (ref 70–108)
GLUCOSE UR QL STRIP.AUTO: NEGATIVE MG/DL
HCT VFR BLD AUTO: 37.8 % (ref 37–47)
HGB BLD-MCNC: 12.5 GM/DL (ref 12–16)
HGB UR QL STRIP.AUTO: ABNORMAL
IMM GRANULOCYTES # BLD AUTO: 0.01 THOU/MM3 (ref 0–0.07)
IMM GRANULOCYTES NFR BLD AUTO: 0.1 %
KETONES UR QL STRIP.AUTO: NEGATIVE
LYMPHOCYTES ABSOLUTE: 4 THOU/MM3 (ref 1–4.8)
LYMPHOCYTES NFR BLD AUTO: 51.2 %
MCH RBC QN AUTO: 30.9 PG (ref 26–33)
MCHC RBC AUTO-ENTMCNC: 33.1 GM/DL (ref 32.2–35.5)
MCV RBC AUTO: 93.6 FL (ref 81–99)
MISCELLANEOUS 2: ABNORMAL
MONOCYTES ABSOLUTE: 0.5 THOU/MM3 (ref 0.4–1.3)
MONOCYTES NFR BLD AUTO: 6.9 %
NEUTROPHILS NFR BLD AUTO: 39 %
NITRITE UR QL STRIP: NEGATIVE
NRBC BLD AUTO-RTO: 0 /100 WBC
OPIATES UR QL SCN: NEGATIVE
OSMOLALITY SERPL CALC.SUM OF ELEC: 276 MOSMOL/KG (ref 275–300)
OXYCODONE: NEGATIVE
PCP UR QL SCN: NEGATIVE
PH UR STRIP.AUTO: 7.5 [PH] (ref 5–9)
PLATELET # BLD AUTO: 281 THOU/MM3 (ref 130–400)
PMV BLD AUTO: 9.1 FL (ref 9.4–12.4)
POTASSIUM SERPL-SCNC: 4 MEQ/L (ref 3.5–5.2)
PROT SERPL-MCNC: 6.7 G/DL (ref 6.1–8)
PROT UR STRIP.AUTO-MCNC: NEGATIVE MG/DL
RBC # BLD AUTO: 4.04 MILL/MM3 (ref 4.2–5.4)
RBC URINE: ABNORMAL /HPF
RENAL EPI CELLS #/AREA URNS HPF: ABNORMAL /[HPF]
SEGMENTED NEUTROPHILS ABSOLUTE COUNT: 3 THOU/MM3 (ref 1.8–7.7)
SODIUM SERPL-SCNC: 139 MEQ/L (ref 135–145)
SP GR UR REFRACT.AUTO: 1.01 (ref 1–1.03)
UROBILINOGEN, URINE: 0.2 EU/DL (ref 0–1)
WBC # BLD AUTO: 7.8 THOU/MM3 (ref 4.8–10.8)
WBC #/AREA URNS HPF: ABNORMAL /HPF
WBC #/AREA URNS HPF: NEGATIVE /[HPF]
YEAST LIKE FUNGI URNS QL MICRO: ABNORMAL

## 2023-03-18 PROCEDURE — 72125 CT NECK SPINE W/O DYE: CPT

## 2023-03-18 PROCEDURE — 84703 CHORIONIC GONADOTROPIN ASSAY: CPT

## 2023-03-18 PROCEDURE — 36415 COLL VENOUS BLD VENIPUNCTURE: CPT

## 2023-03-18 PROCEDURE — 96372 THER/PROPH/DIAG INJ SC/IM: CPT

## 2023-03-18 PROCEDURE — 80053 COMPREHEN METABOLIC PANEL: CPT

## 2023-03-18 PROCEDURE — 81001 URINALYSIS AUTO W/SCOPE: CPT

## 2023-03-18 PROCEDURE — 72100 X-RAY EXAM L-S SPINE 2/3 VWS: CPT

## 2023-03-18 PROCEDURE — 99285 EMERGENCY DEPT VISIT HI MDM: CPT

## 2023-03-18 PROCEDURE — 82077 ASSAY SPEC XCP UR&BREATH IA: CPT

## 2023-03-18 PROCEDURE — 72170 X-RAY EXAM OF PELVIS: CPT

## 2023-03-18 PROCEDURE — 71045 X-RAY EXAM CHEST 1 VIEW: CPT

## 2023-03-18 PROCEDURE — 87086 URINE CULTURE/COLONY COUNT: CPT

## 2023-03-18 PROCEDURE — 80307 DRUG TEST PRSMV CHEM ANLYZR: CPT

## 2023-03-18 PROCEDURE — 6360000002 HC RX W HCPCS: Performed by: STUDENT IN AN ORGANIZED HEALTH CARE EDUCATION/TRAINING PROGRAM

## 2023-03-18 PROCEDURE — 85025 COMPLETE CBC W/AUTO DIFF WBC: CPT

## 2023-03-18 PROCEDURE — 70450 CT HEAD/BRAIN W/O DYE: CPT

## 2023-03-18 PROCEDURE — 6370000000 HC RX 637 (ALT 250 FOR IP): Performed by: STUDENT IN AN ORGANIZED HEALTH CARE EDUCATION/TRAINING PROGRAM

## 2023-03-18 RX ORDER — CYCLOBENZAPRINE HCL 10 MG
10 TABLET ORAL ONCE
Status: COMPLETED | OUTPATIENT
Start: 2023-03-18 | End: 2023-03-18

## 2023-03-18 RX ORDER — KETOROLAC TROMETHAMINE 30 MG/ML
15 INJECTION, SOLUTION INTRAMUSCULAR; INTRAVENOUS ONCE
Status: DISCONTINUED | OUTPATIENT
Start: 2023-03-18 | End: 2023-03-18

## 2023-03-18 RX ORDER — KETOROLAC TROMETHAMINE 30 MG/ML
15 INJECTION, SOLUTION INTRAMUSCULAR; INTRAVENOUS ONCE
Status: COMPLETED | OUTPATIENT
Start: 2023-03-18 | End: 2023-03-18

## 2023-03-18 RX ORDER — 0.9 % SODIUM CHLORIDE 0.9 %
1000 INTRAVENOUS SOLUTION INTRAVENOUS ONCE
Status: DISCONTINUED | OUTPATIENT
Start: 2023-03-18 | End: 2023-03-18

## 2023-03-18 RX ADMIN — CYCLOBENZAPRINE 10 MG: 10 TABLET, FILM COATED ORAL at 13:21

## 2023-03-18 RX ADMIN — KETOROLAC TROMETHAMINE 15 MG: 30 INJECTION, SOLUTION INTRAMUSCULAR at 13:21

## 2023-03-18 ASSESSMENT — PAIN - FUNCTIONAL ASSESSMENT: PAIN_FUNCTIONAL_ASSESSMENT: 0-10

## 2023-03-18 ASSESSMENT — PAIN SCALES - GENERAL
PAINLEVEL_OUTOF10: 6
PAINLEVEL_OUTOF10: 6

## 2023-03-18 ASSESSMENT — PAIN DESCRIPTION - LOCATION: LOCATION: NECK;BACK
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
## 2023-03-18 NOTE — ED NOTES
Dr. Karen Bolton at bedside      [facility], [address] [place] [address]  03/18/23 7495
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
## 2023-03-18 NOTE — Clinical Note
Diana Heck was seen and treated in our emergency department on 3/18/2023. She may return to work on 03/19/2023. If you have any questions or concerns, please don't hesitate to call.       Nelda Gill MD

## 2023-03-19 LAB
BACTERIA UR CULT: ABNORMAL
ORGANISM: ABNORMAL

## 2023-05-01 RX ORDER — ESCITALOPRAM OXALATE 20 MG/1
TABLET ORAL
Qty: 90 TABLET | Refills: 0 | OUTPATIENT
Start: 2023-05-01

## 2023-05-05 NOTE — TELEPHONE ENCOUNTER
Ed Melendez called requesting a refill on the following medications:  Requested Prescriptions     Pending Prescriptions Disp Refills    escitalopram (LEXAPRO) 20 MG tablet 30 tablet 0     Sig: Take 1 tablet by mouth daily     Reports she has two tablets remaining at this time.      Date of last visit: 1/25/2023  Date of next visit (if applicable):5/24/2023  Pharm[facility] Name: [facility] [place]

## 2023-05-06 RX ORDER — ESCITALOPRAM OXALATE 20 MG/1
20 TABLET ORAL DAILY
Qty: 30 TABLET | Refills: 0 | Status: SHIPPED | OUTPATIENT
Start: 2023-05-06

## 2023-05-25 NOTE — TELEPHONE ENCOUNTER
Patient called in requesting a refill of her Escitalopram 20mg  Take 1 tablet by mouth daily  #30 with no refills to the [facility] on [address]. Start date of 05/06/23. Last visit 01/25/23  Next visit 06/21/23    Pending your approval for a 30 day supply with no refills.

## 2023-05-27 RX ORDER — ESCITALOPRAM OXALATE 20 MG/1
20 TABLET ORAL DAILY
Qty: 30 TABLET | Refills: 0 | Status: SHIPPED | OUTPATIENT
Start: 2023-05-27

## 2023-06-21 ENCOUNTER — TELEMEDICINE (OUTPATIENT)
Dept: PSYCHIATRY | Age: 23
End: 2023-06-21

## 2023-06-21 DIAGNOSIS — F34.1 PERSISTENT DEPRESSIVE DISORDER: Primary | ICD-10-CM

## 2023-06-21 DIAGNOSIS — F12.20 CANNABIS USE DISORDER, SEVERE, DEPENDENCE (HCC): ICD-10-CM

## 2023-06-21 RX ORDER — ESCITALOPRAM OXALATE 20 MG/1
20 TABLET ORAL DAILY
Qty: 30 TABLET | Refills: 2 | Status: SHIPPED | OUTPATIENT
Start: 2023-06-21
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
## 2023-07-19 RX ORDER — ESCITALOPRAM OXALATE 20 MG/1
20 TABLET ORAL DAILY
Qty: 30 TABLET | Refills: 2 | OUTPATIENT
Start: 2023-07-19

## 2023-09-18 RX ORDER — ESCITALOPRAM OXALATE 20 MG/1
20 TABLET ORAL DAILY
Qty: 30 TABLET | Refills: 2 | OUTPATIENT
Start: 2023-09-18

## 2023-09-22 ENCOUNTER — TELEPHONE (OUTPATIENT)
Dept: PSYCHIATRY | Age: 23
End: 2023-09-22

## 2023-09-22 RX ORDER — ESCITALOPRAM OXALATE 20 MG/1
20 TABLET ORAL DAILY
Qty: 30 TABLET | Refills: 0 | Status: CANCELLED | OUTPATIENT
Start: 2023-09-22

## 2023-09-22 NOTE — TELEPHONE ENCOUNTER
Patient was transferred to [facility] for refill request. She reports she needs to transition to another provider in the office with morning availability due to her work schedule. Please advise if ok to proceed with transfer. Additionally, patient will need refill of Lexapro. 30 day supply pending.        Requested Prescriptions     Pending Prescriptions Disp Refills    escitalopram (LEXAPRO) 20 MG tablet 30 tablet 0     Sig: Take 1 tablet by mouth daily       Date of last visit: 6/21/2023  Date of next visit (if applicable):Visit date not found  [facility]      ThanksPatti [place], [place]

## 2023-09-23 RX ORDER — ESCITALOPRAM OXALATE 20 MG/1
20 TABLET ORAL DAILY
Qty: 30 TABLET | Refills: 2 | Status: SHIPPED | OUTPATIENT
Start: 2023-09-23 | End: 2023-10-25 | Stop reason: SDUPTHER

## 2023-09-23 RX ORDER — ESCITALOPRAM OXALATE 20 MG/1
20 TABLET ORAL DAILY
Qty: 30 TABLET | Refills: 2 | Status: SHIPPED | OUTPATIENT
Start: 2023-09-23 | End: 2023-09-23 | Stop reason: SDUPTHER

## 2023-09-25 NOTE — TELEPHONE ENCOUNTER
Jn, please see below and advise if you are agreeable to accepting the patient onto your case load?     Thank you

## 2023-09-27 ENCOUNTER — TELEMEDICINE (OUTPATIENT)
Dept: PSYCHIATRY | Age: 23
End: 2023-09-27
Payer: COMMERCIAL

## 2023-09-27 DIAGNOSIS — F34.1 PERSISTENT DEPRESSIVE DISORDER: Primary | ICD-10-CM

## 2023-09-27 DIAGNOSIS — F43.9 TRAUMA AND STRESSOR-RELATED DISORDER: ICD-10-CM

## 2023-09-27 DIAGNOSIS — F41.1 GENERALIZED ANXIETY DISORDER: ICD-10-CM

## 2023-09-27 DIAGNOSIS — G47.00 INSOMNIA, UNSPECIFIED TYPE: ICD-10-CM

## 2023-09-27 DIAGNOSIS — F12.20 CANNABIS USE DISORDER, SEVERE, DEPENDENCE (HCC): ICD-10-CM

## 2023-09-27 DIAGNOSIS — F43.9 STRESS AT HOME: ICD-10-CM

## 2023-09-27 PROCEDURE — 99214 OFFICE O/P EST MOD 30 MIN: CPT

## 2023-09-27 PROCEDURE — G8427 DOCREV CUR MEDS BY ELIG CLIN: HCPCS

## 2023-09-27 PROCEDURE — 90833 PSYTX W PT W E/M 30 MIN: CPT

## 2023-09-27 RX ORDER — BUSPIRONE HYDROCHLORIDE 5 MG/1
5 TABLET ORAL 2 TIMES DAILY
Qty: 60 TABLET | Refills: 0 | Status: SHIPPED | OUTPATIENT
Start: 2023-09-27 | End: 2023-10-27

## 2023-09-27 RX ORDER — NORETHINDRONE ACETATE AND ETHINYL ESTRADIOL 1MG-20(24)
1 KIT ORAL DAILY
COMMUNITY
Start: 2023-08-12

## 2023-09-27 ASSESSMENT — ANXIETY QUESTIONNAIRES
2. NOT BEING ABLE TO STOP OR CONTROL WORRYING: 3
IF YOU CHECKED OFF ANY PROBLEMS ON THIS QUESTIONNAIRE, HOW DIFFICULT HAVE THESE PROBLEMS MADE IT FOR YOU TO DO YOUR WORK, TAKE CARE OF THINGS AT HOME, OR GET ALONG WITH OTHER PEOPLE: VERY DIFFICULT
4. TROUBLE RELAXING: 0
7. FEELING AFRAID AS IF SOMETHING AWFUL MIGHT HAPPEN: 1
5. BEING SO RESTLESS THAT IT IS HARD TO SIT STILL: 0
3. WORRYING TOO MUCH ABOUT DIFFERENT THINGS: 3
6. BECOMING EASILY ANNOYED OR IRRITABLE: 1
1. FEELING NERVOUS, ANXIOUS, OR ON EDGE: 2
GAD7 TOTAL SCORE: 10

## 2023-09-27 ASSESSMENT — PATIENT HEALTH QUESTIONNAIRE - PHQ9
SUM OF ALL RESPONSES TO PHQ QUESTIONS 1-9: 7
6. FEELING BAD ABOUT YOURSELF - OR THAT YOU ARE A FAILURE OR HAVE LET YOURSELF OR YOUR FAMILY DOWN: 0
SUM OF ALL RESPONSES TO PHQ QUESTIONS 1-9: 7
7. TROUBLE CONCENTRATING ON THINGS, SUCH AS READING THE NEWSPAPER OR WATCHING TELEVISION: 0
SUM OF ALL RESPONSES TO PHQ9 QUESTIONS 1 & 2: 2
4. FEELING TIRED OR HAVING LITTLE ENERGY: 2
8. MOVING OR SPEAKING SO SLOWLY THAT OTHER PEOPLE COULD HAVE NOTICED. OR THE OPPOSITE, BEING SO FIGETY OR RESTLESS THAT YOU HAVE BEEN MOVING AROUND A LOT MORE THAN USUAL: 0
9. THOUGHTS THAT YOU WOULD BE BETTER OFF DEAD, OR OF HURTING YOURSELF: 0
2. FEELING DOWN, DEPRESSED OR HOPELESS: 1
SUM OF ALL RESPONSES TO PHQ QUESTIONS 1-9: 7
1. LITTLE INTEREST OR PLEASURE IN DOING THINGS: 1
10. IF YOU CHECKED OFF ANY PROBLEMS, HOW DIFFICULT HAVE THESE PROBLEMS MADE IT FOR YOU TO DO YOUR WORK, TAKE CARE OF THINGS AT HOME, OR GET ALONG WITH OTHER PEOPLE: 1
SUM OF ALL RESPONSES TO PHQ QUESTIONS 1-9: 7
3. TROUBLE FALLING OR STAYING ASLEEP: 2
5. POOR APPETITE OR OVEREATING: 1

## 2023-09-27 NOTE — PROGRESS NOTES
intent to become pregnant and agrees to use approved methods of contraception during treatment. She stated understanding and is agreeable to treatment plan. Additionally, she agrees to inform provider immediately upon learning of becoming pregnant, should a pregnancy occur. Labs: reviewed in medical records, will order TSH. EKG: reviewed in medical records from 4/17/22, QTc 425       Patient has been instructed to seek emergency help via the emergency and/or calling 911 should symptoms become severe, worsen, or with other concerning symptoms. Patient instructed to go immediately to the emergency room and/or call 911 with any suicidal or homicidal ideations or if audio/visual hallucinations develop. Patient given crisis center information. Patient stated understanding and agrees. Support and reassurance given. All questions answered. Patient stated understanding and is agreeable to treatment plan. Encouraged to call office with any questions or concerns. Geoff Lozoyaluke, was evaluated through a synchronous (real-time) audio-video encounter. The patient (or guardian if applicable) is aware that this is a billable service, which includes applicable co-pays. This Virtual Visit was conducted with patient's (and/or legal guardian's) consent. Patient identification was verified, and a caregiver was present when appropriate. The patient was located at Home: [address] [place] [address] [address]  [address]  Provider was located at Home ([address]): [place]    16 minutes used in supportive psychotherapy as part of the patient's treatment plan to improve/address anxiety. Modality included pscyhoeducation, CBT, MI and reflective listening of coping skills for managing anxiety. Patient responsive and engaged, able to identify cognitive distortions and formulate plan for improvement. Will continue to work on these issues in future session and refer to outpatient therapy.  The remainder of session spent on

## 2023-10-03 PROBLEM — G47.00 INSOMNIA: Status: ACTIVE | Noted: 2023-10-03

## 2023-10-03 PROBLEM — F43.9 TRAUMA AND STRESSOR-RELATED DISORDER: Status: ACTIVE | Noted: 2023-10-03

## 2023-10-03 PROBLEM — F12.20 CANNABIS USE DISORDER, SEVERE, DEPENDENCE (HCC): Status: ACTIVE | Noted: 2023-10-03

## 2023-10-03 PROBLEM — F43.9 STRESS AT HOME: Status: ACTIVE | Noted: 2023-10-03

## 2023-10-03 PROBLEM — F34.1 PERSISTENT DEPRESSIVE DISORDER: Status: ACTIVE | Noted: 2023-10-03

## 2023-10-03 PROBLEM — F41.1 GENERALIZED ANXIETY DISORDER: Status: ACTIVE | Noted: 2023-10-03

## 2023-10-03 NOTE — PATIENT INSTRUCTIONS
-Please take medications as prescribed  -Refrain from alcohol or drug use  -Seek emergency help via the emergency and/or calling 911 should symptoms become severe, worsen, or with other concerning symptoms. Go immediately to the emergency room and/or call 911 with any suicidal or homicidal ideations or if audio/visual hallucinations develop.   -Contact office with any questions or concerns. Crisis phone numbers:  Karen Lino, and .[facility] [phone]. Vikash Fonseca and [place] [address] [phone]. [facility] [phone]. [address] [place] [address]. Fabian Antonio [phone].

## 2023-10-25 ENCOUNTER — TELEMEDICINE (OUTPATIENT)
Dept: PSYCHIATRY | Age: 23
End: 2023-10-25
Payer: COMMERCIAL

## 2023-10-25 DIAGNOSIS — F41.1 GENERALIZED ANXIETY DISORDER: ICD-10-CM

## 2023-10-25 DIAGNOSIS — G47.00 INSOMNIA, UNSPECIFIED TYPE: ICD-10-CM

## 2023-10-25 DIAGNOSIS — F43.9 STRESS AT HOME: ICD-10-CM

## 2023-10-25 DIAGNOSIS — F12.20 CANNABIS USE DISORDER, SEVERE, DEPENDENCE (HCC): ICD-10-CM

## 2023-10-25 DIAGNOSIS — F43.21 GRIEF: ICD-10-CM

## 2023-10-25 DIAGNOSIS — F43.9 TRAUMA AND STRESSOR-RELATED DISORDER: ICD-10-CM

## 2023-10-25 DIAGNOSIS — F34.1 PERSISTENT DEPRESSIVE DISORDER: Primary | ICD-10-CM

## 2023-10-25 PROCEDURE — G8427 DOCREV CUR MEDS BY ELIG CLIN: HCPCS

## 2023-10-25 PROCEDURE — 99214 OFFICE O/P EST MOD 30 MIN: CPT

## 2023-10-25 RX ORDER — BUSPIRONE HYDROCHLORIDE 10 MG/1
10 TABLET ORAL 2 TIMES DAILY
Qty: 60 TABLET | Refills: 1 | Status: SHIPPED | OUTPATIENT
Start: 2023-10-25 | End: 2023-12-24

## 2023-10-25 RX ORDER — ESCITALOPRAM OXALATE 20 MG/1
20 TABLET ORAL DAILY
Qty: 30 TABLET | Refills: 2 | Status: SHIPPED | OUTPATIENT
Start: 2023-10-25

## 2023-10-25 NOTE — PROGRESS NOTES
this encounter: Not billed by time        Provider Signature:  Electronically signed by Sheryle Sins, APRN - CNP on 10/25/2023 at 4:18 PM    **This report has been created using voice recognition software. It may contain minor errors which are inherent in voice recognition technology. **

## 2023-10-25 NOTE — PATIENT INSTRUCTIONS
-Please take medications as prescribed  -Refrain from alcohol or drug use  -Seek emergency help via the emergency and/or calling 911 should symptoms become severe, worsen, or with other concerning symptoms. Go immediately to the emergency room and/or call 911 with any suicidal or homicidal ideations or if audio/visual hallucinations develop.   -Contact office with any questions or concerns. Crisis phone numbers:  Otto Vazquez, and .[facility] [phone]. Nellie Ruelas and Ron[place] [address] [address] [facility] [phone]. Johnathan and [facility] [phone]. [address] [place] [address]. [facility], and [facility] [phone].

## 2023-11-08 RX ORDER — BUSPIRONE HYDROCHLORIDE 10 MG/1
10 TABLET ORAL 2 TIMES DAILY
Qty: 60 TABLET | Refills: 1 | Status: SHIPPED | OUTPATIENT
Start: 2023-11-08 | End: 2024-01-07

## 2023-11-08 RX ORDER — BUSPIRONE HYDROCHLORIDE 5 MG/1
5 TABLET ORAL 2 TIMES DAILY
Qty: 60 TABLET | Refills: 0 | OUTPATIENT
Start: 2023-11-08

## 2024-03-06 RX ORDER — ESCITALOPRAM OXALATE 20 MG/1
20 TABLET ORAL DAILY
Qty: 30 TABLET | Refills: 0 | OUTPATIENT
Start: 2024-03-06

## 2024-03-18 RX ORDER — BUSPIRONE HYDROCHLORIDE 10 MG/1
10 TABLET ORAL 2 TIMES DAILY
Qty: 60 TABLET | Refills: 0 | OUTPATIENT
Start: 2024-03-18

## 2024-03-18 NOTE — TELEPHONE ENCOUNTER
Pharmacy is requesting a refill of the following medication:    Escitalopram 20mg  #30 with 2 refills to the [facility] on [address]. Previous prescription was sent on 10/25/23 for #30 with 2 refills.    Patient states she has been taking her medication but has missed some days taking it. Patient stated she hasn't been out for very long. Called [facility] on [address] and patient never picked up her prescription on 10/25/23 that was sent. [facility] stated they had prescriptions from   on 06/21/23 and 09/23/23. Patient filled the 09/23/23 script on 02/10/24.  Patient stated she hasn't gotten any prescriptions from any other provider.    Last visit 10/25/23  Patient no showed her appointment on 11/29/23 because she didn't have any insurance. Patient informed that she will need to be seen.

## 2024-03-20 RX ORDER — ESCITALOPRAM OXALATE 20 MG/1
20 TABLET ORAL DAILY
Qty: 30 TABLET | Refills: 0 | Status: SHIPPED | OUTPATIENT
Start: 2024-03-20

## 2024-04-02 ENCOUNTER — TELEMEDICINE (OUTPATIENT)
Dept: PSYCHIATRY | Age: 24
End: 2024-04-02

## 2024-04-02 DIAGNOSIS — F34.1 PERSISTENT DEPRESSIVE DISORDER: Primary | ICD-10-CM

## 2024-04-02 DIAGNOSIS — F12.20 CANNABIS USE DISORDER, SEVERE, DEPENDENCE (HCC): ICD-10-CM

## 2024-04-02 DIAGNOSIS — F41.1 GENERALIZED ANXIETY DISORDER: ICD-10-CM

## 2024-04-02 DIAGNOSIS — F43.9 TRAUMA AND STRESSOR-RELATED DISORDER: ICD-10-CM

## 2024-04-02 DIAGNOSIS — F43.21 GRIEF: ICD-10-CM

## 2024-04-02 DIAGNOSIS — Z91.148 NONADHERENCE TO MEDICATION: ICD-10-CM

## 2024-04-02 DIAGNOSIS — G47.00 INSOMNIA, UNSPECIFIED TYPE: ICD-10-CM

## 2024-04-02 PROCEDURE — 99214 OFFICE O/P EST MOD 30 MIN: CPT

## 2024-04-02 RX ORDER — BUSPIRONE HYDROCHLORIDE 5 MG/1
5 TABLET ORAL 2 TIMES DAILY
Qty: 60 TABLET | Refills: 0 | Status: SHIPPED | OUTPATIENT
Start: 2024-04-02 | End: 2024-05-02

## 2024-04-02 RX ORDER — ESCITALOPRAM OXALATE 20 MG/1
20 TABLET ORAL DAILY
Qty: 30 TABLET | Refills: 2 | Status: SHIPPED | OUTPATIENT
Start: 2024-04-02

## 2024-04-02 NOTE — PROGRESS NOTES
dependence (HCC)    5. Insomnia, unspecified type    6. Nonadherence to medication    7. Grief          PLAN   Follow-up:  Return in about 4 weeks (around 4/30/2024), or if symptoms worsen or fail to improve.    Prescriptions for this encounter:  New Prescriptions    BUSPIRONE (BUSPAR) 5 MG TABLET    Take 1 tablet by mouth 2 times daily       Orders Placed This Encounter   Medications    busPIRone (BUSPAR) 5 MG tablet     Sig: Take 1 tablet by mouth 2 times daily     Dispense:  60 tablet     Refill:  0    escitalopram (LEXAPRO) 20 MG tablet     Sig: Take 1 tablet by mouth daily     Dispense:  30 tablet     Refill:  2       Medications Discontinued During This Encounter   Medication Reason    escitalopram (LEXAPRO) 20 MG tablet REORDER       Additional orders:  Orders Placed This Encounter   Procedures    CBC with Auto Differential    Comprehensive Metabolic Panel     Condition: Patient appears in no acute distress  Reports depressive symptoms are overall stable, reports anxiety has worsened but patient has been noncompliant with buspar. Discussed importance of medication compliance as well as f/u for management of symptoms, patient verbalizes understanding. She is experiencing grief and has had trouble processing these emotions. She reports past trauma and symptoms which may be associated with PTSD, will rule out. The problem of recurrent insomnia is discussed. Avoidance of caffeine sources is strongly encouraged. Sleep hygiene issues are reviewed. Discussed the importance of individual psychotherapy in managing mood and anxiety and advised patient to establish care with a therapist, resources given. Discussed effects of marijuana on mood, Patient advised to abstain from alcohol, tobacco products and illicit substance use. Discussed treatment options with patient, she is asking to continue Lexapro and re-start Buspar.       Medication Adjustments:   -Continue Lexapro 20 mg PO daily for management of mood and

## 2024-04-09 NOTE — PATIENT INSTRUCTIONS
-Please take medications as prescribed  -Refrain from alcohol or drug use  -Seek emergency help via the emergency and/or calling 911 should symptoms become severe, worsen, or with other concerning symptoms.Go immediately to the emergency room and/or call 911 with any suicidal or homicidal ideations or if audio/visual hallucinations develop.   -Contact office with any questions or concerns.     Crisis phone numbers:  [facility] [phone].  [facility] [phone]  [facility] [phone].  [facility] [phone].  [facility] [phone].  [facility] [phone].

## 2024-04-24 ENCOUNTER — TELEMEDICINE (OUTPATIENT)
Dept: PSYCHIATRY | Age: 24
End: 2024-04-24

## 2024-04-24 DIAGNOSIS — F43.21 GRIEF: ICD-10-CM

## 2024-04-24 DIAGNOSIS — F34.1 PERSISTENT DEPRESSIVE DISORDER: ICD-10-CM

## 2024-04-24 DIAGNOSIS — F43.9 TRAUMA AND STRESSOR-RELATED DISORDER: ICD-10-CM

## 2024-04-24 DIAGNOSIS — F12.20 CANNABIS USE DISORDER, SEVERE, DEPENDENCE (HCC): ICD-10-CM

## 2024-04-24 DIAGNOSIS — F41.1 GENERALIZED ANXIETY DISORDER: Primary | ICD-10-CM

## 2024-04-24 DIAGNOSIS — G47.00 INSOMNIA, UNSPECIFIED TYPE: ICD-10-CM

## 2024-04-24 PROCEDURE — 99214 OFFICE O/P EST MOD 30 MIN: CPT

## 2024-04-24 RX ORDER — BUSPIRONE HYDROCHLORIDE 10 MG/1
10 TABLET ORAL 2 TIMES DAILY
Qty: 60 TABLET | Refills: 0 | Status: SHIPPED | OUTPATIENT
Start: 2024-04-24 | End: 2024-05-24

## 2024-04-24 NOTE — PROGRESS NOTES
denies intent to become pregnant and agrees to use approved methods of contraception during treatment. She stated understanding and is agreeable to treatment plan. Additionally, she agrees to inform provider immediately upon learning of becoming pregnant, should a pregnancy occur.       Labs: lab orders  pending advised patient to have completed ASAP patient verbalizes understanding     EKG: reviewed in medical records from 4/17/22, QTc 425       Patient has been instructed to seek emergency help via the emergency and/or calling 911 should symptoms become severe, worsen, or with other concerning symptoms. Patient instructed to go immediately to the emergency room and/or call 911 with any suicidal or homicidal ideations or if audio/visual hallucinations develop.  Patient given crisis center information. Patient stated understanding and agrees.     Support and reassurance given. All questions answered. Patient stated understanding and is agreeable to treatment plan. Encouraged to call office with any questions or concerns.         Zak Goel, was evaluated through a synchronous (real-time) audio-video encounter. The patient (or guardian if applicable) is aware that this is a billable service, which includes applicable co-pays. This Virtual Visit was conducted with patient's (and/or legal guardian's) consent. Patient identification was verified, and a caregiver was present when appropriate.   The patient was located at Home: [address] [address]  Provider was located at Home (Appt Dept State): [place]        Total time spent for this encounter: Not billed by time        Provider Signature:  Electronically signed by KARINE Magallanes CNP on 4/24/2024 at 11:11 AM    **This report has been created using voice recognition software. It may contain minor errors which are inherent in voice recognition technology.**

## 2024-04-24 NOTE — PATIENT INSTRUCTIONS
-Please take medications as prescribed  -Refrain from alcohol or drug use  -Seek emergency help via the emergency and/or calling 911 should symptoms become severe, worsen, or with other concerning symptoms.Go immediately to the emergency room and/or call 911 with any suicidal or homicidal ideations or if audio/visual hallucinations develop.   -Contact office with any questions or concerns.     Crisis phone numbers:  [facility] [phone].  [facility] [phone]  [facility] [phone].  [facility] [phone].  [facility] [phone].  [facility] [phone].

## 2024-05-22 ENCOUNTER — TELEMEDICINE (OUTPATIENT)
Dept: PSYCHIATRY | Age: 24
End: 2024-05-22

## 2024-05-22 DIAGNOSIS — F41.1 GENERALIZED ANXIETY DISORDER: Primary | ICD-10-CM

## 2024-05-22 DIAGNOSIS — F34.1 PERSISTENT DEPRESSIVE DISORDER: ICD-10-CM

## 2024-05-22 DIAGNOSIS — F43.9 TRAUMA AND STRESSOR-RELATED DISORDER: ICD-10-CM

## 2024-05-22 DIAGNOSIS — F12.20 CANNABIS USE DISORDER, SEVERE, DEPENDENCE (HCC): ICD-10-CM

## 2024-05-22 PROCEDURE — 99214 OFFICE O/P EST MOD 30 MIN: CPT

## 2024-05-22 RX ORDER — ESCITALOPRAM OXALATE 20 MG/1
20 TABLET ORAL DAILY
Qty: 30 TABLET | Refills: 2 | Status: SHIPPED | OUTPATIENT
Start: 2024-05-22

## 2024-05-22 RX ORDER — BUSPIRONE HYDROCHLORIDE 10 MG/1
TABLET ORAL
Qty: 60 TABLET | Refills: 2 | Status: SHIPPED | OUTPATIENT
Start: 2024-05-22

## 2024-05-22 NOTE — PATIENT INSTRUCTIONS
-Please take medications as prescribed  -Refrain from alcohol or drug use  -Seek emergency help via the emergency and/or calling 911 should symptoms become severe, worsen, or with other concerning symptoms.Go immediately to the emergency room and/or call 911 with any suicidal or homicidal ideations or if audio/visual hallucinations develop.   -Contact office with any questions or concerns.     Crisis phone numbers:  [facility] [phone].  [facility] [phone]  [facility] [place] [phone].  [facility] [phone].  [facility] [phone].  [facility] [phone].

## 2024-05-22 NOTE — PROGRESS NOTES
[facility] [place] [facility]  [facility] - [facility] PSYCHIATRY  [address]  [facility] [address]  Dept: [phone]  Dept Fax: [phone]  Loc: [phone]    Visit Date: 5/22/2024    SUBJECTIVE DATA     CHIEF COMPLAINT:    Chief Complaint   Patient presents with    Anxiety    Depression    Follow-up       History obtained from: patient    HISTORY OF PRESENT ILLNESS:    TELEPSYCHIATRY VISIT -- Audio/Visual (During COVID-19 public health emergency)     Zak Goel, was evaluated through a synchronous (real-time) audio-video encounter. The patient (or guardian if applicable) is aware that this is a billable service, which includes applicable co-pays. This Virtual Visit was conducted with patient's (and/or legal guardian's) consent. The visit was conducted pursuant to the emergency declaration under the Mcdonald Act and the National Emergencies Act, 1135 waiver authority and the Coronavirus Preparedness and Response Supplemental Appropriations Act. Patient identification was verified, and a caregiver was present when appropriate. The patient was located in a state where the provider was licensed to provide care.Services were provided through a video synchronous discussion virtually to substitute for in-person clinic visit. Patient was located at their individual home ([place]) and provider was located at their individual homes ([place]).     Patient presents for follow up for management of anxiety and depression.    She reports medication compliance with Lexapro, reports feeling fatigued from increase in buspar, feels it is working well to manage anxiety, denies other side effects. States she is doing \"good.\"   HPI      Reports mood is overall stable  -Denies feeling sad, down or depressed   -Denies anhedonia   -Denies issues with concentration  -Reports appetite is stable  -Denies feeling of Guilt  -Denies feeling Hopeless or helpless  -Reports Motivation is stable reports energy is \"ok\"  -Denies

## 2024-07-02 ENCOUNTER — TELEMEDICINE (OUTPATIENT)
Dept: PSYCHIATRY | Age: 24
End: 2024-07-02

## 2024-07-02 DIAGNOSIS — F41.1 GENERALIZED ANXIETY DISORDER: ICD-10-CM

## 2024-07-02 DIAGNOSIS — F12.20 CANNABIS USE DISORDER, SEVERE, DEPENDENCE (HCC): ICD-10-CM

## 2024-07-02 DIAGNOSIS — F34.1 PERSISTENT DEPRESSIVE DISORDER: Primary | ICD-10-CM

## 2024-07-02 DIAGNOSIS — F43.9 TRAUMA AND STRESSOR-RELATED DISORDER: ICD-10-CM

## 2024-07-02 RX ORDER — ESCITALOPRAM OXALATE 20 MG/1
20 TABLET ORAL DAILY
Qty: 30 TABLET | Refills: 2 | Status: SHIPPED | OUTPATIENT
Start: 2024-07-02

## 2024-07-02 RX ORDER — BUSPIRONE HYDROCHLORIDE 10 MG/1
TABLET ORAL
Qty: 60 TABLET | Refills: 2 | Status: SHIPPED | OUTPATIENT
Start: 2024-07-02

## 2024-07-02 NOTE — PROGRESS NOTES
[facility] [place] [facility]  [facility] - [facility] PSYCHIATRY  [address]  [facility] [address]  Dept: [phone]  Dept Fax: [phone]  Loc: [phone]    Visit Date: 7/2/2024    SUBJECTIVE DATA     CHIEF COMPLAINT:    Chief Complaint   Patient presents with    Anxiety    Depression    Follow-up       History obtained from: patient    HISTORY OF PRESENT ILLNESS:    TELEPSYCHIATRY VISIT -- Audio/Visual (During COVID-19 public health emergency)     Zak Goel, was evaluated through a synchronous (real-time) audio-video encounter. The patient (or guardian if applicable) is aware that this is a billable service, which includes applicable co-pays. This Virtual Visit was conducted with patient's (and/or legal guardian's) consent. The visit was conducted pursuant to the emergency declaration under the Mcdonald Act and the National Emergencies Act, 1135 waiver authority and the Coronavirus Preparedness and Response Supplemental Appropriations Act. Patient identification was verified, and a caregiver was present when appropriate. The patient was located in a state where the provider was licensed to provide care.Services were provided through a video synchronous discussion virtually to substitute for in-person clinic visit. Patient was located [place] and provider was located [place] their [place] homes ([place]).     Patient presents for follow up for management of anxiety and depression.    She reports medication compliance with Lexapro, denies other side effects. States she is doing \"good.\"   HPI      Reports mood is overall stable  -Denies feeling sad, down or depressed   -Denies anhedonia   -Denies issues with concentration  -Reports appetite is stable  -Denies feeling of Guilt  -Denies feeling Hopeless or helpless  -Reports Motivation is stable reports energy are \"decent\"   -Denies sleep disturbances   -Denies Suicidal/Homicidal thoughts    Reports anxiety is stable

## 2024-07-02 NOTE — PATIENT INSTRUCTIONS
-Please take medications as prescribed  -Refrain from alcohol or drug use  -Seek emergency help via the emergency and/or calling 911 should symptoms become severe, worsen, or with other concerning symptoms.Go immediately to the emergency room and/or call 911 with any suicidal or homicidal ideations or if audio/visual hallucinations develop.   -Contact office with any questions or concerns.     Crisis phone numbers:  [facility] [phone].  [facility] [phone]  [facility] [phone].  [facility] [phone].  [facility] [phone].  [facility] [phone].

## 2024-10-17 RX ORDER — ESCITALOPRAM OXALATE 20 MG/1
20 TABLET ORAL DAILY
Qty: 30 TABLET | Refills: 0 | Status: SHIPPED | OUTPATIENT
Start: 2024-10-17

## 2024-10-17 NOTE — TELEPHONE ENCOUNTER
MEE PATIENT  Zak called into the office leaving a VM intially stating that she needs to schedule an appointment due to  her missing her last one and request a refill on her Lexapro 20mg medication; she said that she ran out 2 days ago. Last Rx was sent on 07/02/24 for a 30 day supply with 2 refills,.    Medication is pending your approval for a 30 day supply with 0 refills; please advise otherwise. Last seen on 07/02/24 and is scheduled to return on 11/05/24.

## 2024-11-05 RX ORDER — ESCITALOPRAM OXALATE 20 MG/1
20 TABLET ORAL DAILY
Qty: 30 TABLET | Refills: 2 | Status: SHIPPED | OUTPATIENT
Start: 2024-11-05

## 2024-11-05 NOTE — TELEPHONE ENCOUNTER
Called patient to get her rescheduled and she stated she needs a refill of the following mediation:    Escitalopram 20mg  # 30 with no refillos to the [facility] on [address]. Previous prescription was sent on 10/17/24 for #30 with no refills.    Last visit 07/02/24 (Was on for today)  Next visit 12/03/24    Pending your approval

## 2025-01-24 ENCOUNTER — TELEMEDICINE (OUTPATIENT)
Dept: PSYCHIATRY | Age: 25
End: 2025-01-24

## 2025-01-24 DIAGNOSIS — F34.1 PERSISTENT DEPRESSIVE DISORDER: Primary | ICD-10-CM

## 2025-01-24 DIAGNOSIS — F12.20 CANNABIS USE DISORDER, SEVERE, DEPENDENCE (HCC): ICD-10-CM

## 2025-01-24 DIAGNOSIS — Z56.6 STRESS AT WORK: ICD-10-CM

## 2025-01-24 DIAGNOSIS — F41.1 GENERALIZED ANXIETY DISORDER: ICD-10-CM

## 2025-01-24 DIAGNOSIS — F43.9 TRAUMA AND STRESSOR-RELATED DISORDER: ICD-10-CM

## 2025-01-24 PROBLEM — G47.00 INSOMNIA: Status: RESOLVED | Noted: 2023-10-03 | Resolved: 2025-01-24

## 2025-01-24 PROBLEM — F43.21 GRIEF: Status: RESOLVED | Noted: 2023-10-25 | Resolved: 2025-01-24

## 2025-01-24 PROCEDURE — 99214 OFFICE O/P EST MOD 30 MIN: CPT

## 2025-01-24 RX ORDER — BUSPIRONE HYDROCHLORIDE 10 MG/1
TABLET ORAL
Qty: 60 TABLET | Refills: 5 | Status: SHIPPED | OUTPATIENT
Start: 2025-01-24

## 2025-01-24 RX ORDER — ESCITALOPRAM OXALATE 20 MG/1
20 TABLET ORAL DAILY
Qty: 30 TABLET | Refills: 5 | Status: SHIPPED | OUTPATIENT
Start: 2025-01-24

## 2025-01-24 SDOH — HEALTH STABILITY - MENTAL HEALTH: OTHER PHYSICAL AND MENTAL STRAIN RELATED TO WORK: Z56.6

## 2025-01-24 ASSESSMENT — PATIENT HEALTH QUESTIONNAIRE - PHQ9
9. THOUGHTS THAT YOU WOULD BE BETTER OFF DEAD, OR OF HURTING YOURSELF: NOT AT ALL
SUM OF ALL RESPONSES TO PHQ QUESTIONS 1-9: 2
6. FEELING BAD ABOUT YOURSELF - OR THAT YOU ARE A FAILURE OR HAVE LET YOURSELF OR YOUR FAMILY DOWN: NOT AT ALL
3. TROUBLE FALLING OR STAYING ASLEEP: NOT AT ALL
5. POOR APPETITE OR OVEREATING: NOT AT ALL
4. FEELING TIRED OR HAVING LITTLE ENERGY: MORE THAN HALF THE DAYS
7. TROUBLE CONCENTRATING ON THINGS, SUCH AS READING THE NEWSPAPER OR WATCHING TELEVISION: NOT AT ALL
SUM OF ALL RESPONSES TO PHQ9 QUESTIONS 1 & 2: 0
8. MOVING OR SPEAKING SO SLOWLY THAT OTHER PEOPLE COULD HAVE NOTICED. OR THE OPPOSITE, BEING SO FIGETY OR RESTLESS THAT YOU HAVE BEEN MOVING AROUND A LOT MORE THAN USUAL: NOT AT ALL
2. FEELING DOWN, DEPRESSED OR HOPELESS: NOT AT ALL
10. IF YOU CHECKED OFF ANY PROBLEMS, HOW DIFFICULT HAVE THESE PROBLEMS MADE IT FOR YOU TO DO YOUR WORK, TAKE CARE OF THINGS AT HOME, OR GET ALONG WITH OTHER PEOPLE: SOMEWHAT DIFFICULT
1. LITTLE INTEREST OR PLEASURE IN DOING THINGS: NOT AT ALL
SUM OF ALL RESPONSES TO PHQ QUESTIONS 1-9: 2

## 2025-01-24 ASSESSMENT — ANXIETY QUESTIONNAIRES
3. WORRYING TOO MUCH ABOUT DIFFERENT THINGS: SEVERAL DAYS
GAD7 TOTAL SCORE: 2
6. BECOMING EASILY ANNOYED OR IRRITABLE: NOT AT ALL
7. FEELING AFRAID AS IF SOMETHING AWFUL MIGHT HAPPEN: NOT AT ALL
1. FEELING NERVOUS, ANXIOUS, OR ON EDGE: NOT AT ALL
5. BEING SO RESTLESS THAT IT IS HARD TO SIT STILL: NOT AT ALL
IF YOU CHECKED OFF ANY PROBLEMS ON THIS QUESTIONNAIRE, HOW DIFFICULT HAVE THESE PROBLEMS MADE IT FOR YOU TO DO YOUR WORK, TAKE CARE OF THINGS AT HOME, OR GET ALONG WITH OTHER PEOPLE: SOMEWHAT DIFFICULT
4. TROUBLE RELAXING: NOT AT ALL
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
## 2025-01-24 NOTE — PATIENT INSTRUCTIONS
-Please take medications as prescribed  -Refrain from alcohol or drug use  -Seek emergency help via the emergency and/or calling 911 should symptoms become severe, worsen, or with other concerning symptoms.Go immediately to the emergency room and/or call 911 with any suicidal or homicidal ideations or if audio/visual hallucinations develop.   -Contact office with any questions or concerns.     Crisis phone numbers - 046 Crisis Line  [facility] [phone].  [facility] [phone]  [facility] [place] [phone].  [facility] [phone].  [facility] [phone].  [facility] [phone].

## 2025-07-23 ENCOUNTER — TELEPHONE (OUTPATIENT)
Dept: PSYCHIATRY | Age: 25
End: 2025-07-23

## 2025-07-23 NOTE — TELEPHONE ENCOUNTER
Patient was set up to be seen on 07/22/25 and patient no showed. This is the patient third no show. Should the patient be termed?